# Patient Record
Sex: MALE | Race: BLACK OR AFRICAN AMERICAN | NOT HISPANIC OR LATINO | Employment: FULL TIME | RURAL
[De-identification: names, ages, dates, MRNs, and addresses within clinical notes are randomized per-mention and may not be internally consistent; named-entity substitution may affect disease eponyms.]

---

## 2021-07-21 ENCOUNTER — OFFICE VISIT (OUTPATIENT)
Dept: FAMILY MEDICINE | Facility: CLINIC | Age: 36
End: 2021-07-21
Payer: COMMERCIAL

## 2021-07-21 VITALS
SYSTOLIC BLOOD PRESSURE: 170 MMHG | HEIGHT: 70 IN | HEART RATE: 84 BPM | BODY MASS INDEX: 28.77 KG/M2 | DIASTOLIC BLOOD PRESSURE: 108 MMHG | RESPIRATION RATE: 16 BRPM | WEIGHT: 201 LBS

## 2021-07-21 DIAGNOSIS — I10 HYPERTENSION, UNSPECIFIED TYPE: Primary | ICD-10-CM

## 2021-07-21 PROCEDURE — 99213 PR OFFICE/OUTPT VISIT, EST, LEVL III, 20-29 MIN: ICD-10-PCS | Mod: ,,, | Performed by: NURSE PRACTITIONER

## 2021-07-21 PROCEDURE — 3080F PR MOST RECENT DIASTOLIC BLOOD PRESSURE >= 90 MM HG: ICD-10-PCS | Mod: CPTII,,, | Performed by: NURSE PRACTITIONER

## 2021-07-21 PROCEDURE — 3077F SYST BP >= 140 MM HG: CPT | Mod: CPTII,,, | Performed by: NURSE PRACTITIONER

## 2021-07-21 PROCEDURE — 99213 OFFICE O/P EST LOW 20 MIN: CPT | Mod: ,,, | Performed by: NURSE PRACTITIONER

## 2021-07-21 PROCEDURE — 3077F PR MOST RECENT SYSTOLIC BLOOD PRESSURE >= 140 MM HG: ICD-10-PCS | Mod: CPTII,,, | Performed by: NURSE PRACTITIONER

## 2021-07-21 PROCEDURE — 3080F DIAST BP >= 90 MM HG: CPT | Mod: CPTII,,, | Performed by: NURSE PRACTITIONER

## 2021-07-21 PROCEDURE — 3008F BODY MASS INDEX DOCD: CPT | Mod: CPTII,,, | Performed by: NURSE PRACTITIONER

## 2021-07-21 PROCEDURE — 3008F PR BODY MASS INDEX (BMI) DOCUMENTED: ICD-10-PCS | Mod: CPTII,,, | Performed by: NURSE PRACTITIONER

## 2021-07-21 RX ORDER — LOSARTAN POTASSIUM AND HYDROCHLOROTHIAZIDE 12.5; 5 MG/1; MG/1
1 TABLET ORAL DAILY
Qty: 90 TABLET | Refills: 3 | Status: SHIPPED | OUTPATIENT
Start: 2021-07-21 | End: 2021-08-24

## 2021-08-20 ENCOUNTER — CLINICAL SUPPORT (OUTPATIENT)
Dept: FAMILY MEDICINE | Facility: CLINIC | Age: 36
End: 2021-08-20
Payer: COMMERCIAL

## 2021-08-20 VITALS — DIASTOLIC BLOOD PRESSURE: 103 MMHG | SYSTOLIC BLOOD PRESSURE: 155 MMHG

## 2021-08-20 RX ORDER — LOSARTAN POTASSIUM AND HYDROCHLOROTHIAZIDE 25; 100 MG/1; MG/1
1 TABLET ORAL DAILY
Qty: 30 TABLET | Refills: 0 | Status: SHIPPED | OUTPATIENT
Start: 2021-08-20 | End: 2021-08-24

## 2021-08-20 RX ORDER — LOSARTAN POTASSIUM AND HYDROCHLOROTHIAZIDE 25; 100 MG/1; MG/1
1 TABLET ORAL DAILY
COMMUNITY
End: 2021-08-20 | Stop reason: SDUPTHER

## 2021-08-24 ENCOUNTER — OFFICE VISIT (OUTPATIENT)
Dept: FAMILY MEDICINE | Facility: CLINIC | Age: 36
End: 2021-08-24
Payer: COMMERCIAL

## 2021-08-24 VITALS
RESPIRATION RATE: 16 BRPM | WEIGHT: 199 LBS | HEART RATE: 80 BPM | BODY MASS INDEX: 28.49 KG/M2 | SYSTOLIC BLOOD PRESSURE: 143 MMHG | HEIGHT: 70 IN | DIASTOLIC BLOOD PRESSURE: 91 MMHG

## 2021-08-24 DIAGNOSIS — I10 ESSENTIAL HYPERTENSION, MALIGNANT: Primary | ICD-10-CM

## 2021-08-24 LAB
ALBUMIN SERPL BCP-MCNC: 1.4 G/DL (ref 3.5–5)
ALBUMIN/GLOB SERPL: 0.4 {RATIO}
ALP SERPL-CCNC: 58 U/L (ref 45–115)
ALT SERPL W P-5'-P-CCNC: 33 U/L (ref 16–61)
ANION GAP SERPL CALCULATED.3IONS-SCNC: 9 MMOL/L (ref 7–16)
AST SERPL W P-5'-P-CCNC: 63 U/L (ref 15–37)
BASOPHILS # BLD AUTO: 0.07 K/UL (ref 0–0.2)
BASOPHILS NFR BLD AUTO: 0.9 % (ref 0–1)
BILIRUB SERPL-MCNC: 0.4 MG/DL (ref 0–1.2)
BUN SERPL-MCNC: 20 MG/DL (ref 7–18)
BUN/CREAT SERPL: 17 (ref 6–20)
CALCIUM SERPL-MCNC: 8.5 MG/DL (ref 8.5–10.1)
CHLORIDE SERPL-SCNC: 105 MMOL/L (ref 98–107)
CHOLEST SERPL-MCNC: 365 MG/DL (ref 0–200)
CHOLEST/HDLC SERPL: 4.2 {RATIO}
CO2 SERPL-SCNC: 29 MMOL/L (ref 21–32)
CREAT SERPL-MCNC: 1.17 MG/DL (ref 0.7–1.3)
DIFFERENTIAL METHOD BLD: ABNORMAL
EOSINOPHIL # BLD AUTO: 0.32 K/UL (ref 0–0.5)
EOSINOPHIL NFR BLD AUTO: 4.3 % (ref 1–4)
ERYTHROCYTE [DISTWIDTH] IN BLOOD BY AUTOMATED COUNT: 13.1 % (ref 11.5–14.5)
GLOBULIN SER-MCNC: 4 G/DL (ref 2–4)
GLUCOSE SERPL-MCNC: 78 MG/DL (ref 74–106)
HCT VFR BLD AUTO: 42.2 % (ref 40–54)
HDLC SERPL-MCNC: 87 MG/DL (ref 40–60)
HGB BLD-MCNC: 14.3 G/DL (ref 13.5–18)
IMM GRANULOCYTES # BLD AUTO: 0.01 K/UL (ref 0–0.04)
IMM GRANULOCYTES NFR BLD: 0.1 % (ref 0–0.4)
LDLC SERPL CALC-MCNC: 261 MG/DL
LDLC/HDLC SERPL: 3 {RATIO}
LYMPHOCYTES # BLD AUTO: 2.77 K/UL (ref 1–4.8)
LYMPHOCYTES NFR BLD AUTO: 37.1 % (ref 27–41)
MCH RBC QN AUTO: 31 PG (ref 27–31)
MCHC RBC AUTO-ENTMCNC: 33.9 G/DL (ref 32–36)
MCV RBC AUTO: 91.3 FL (ref 80–96)
MONOCYTES # BLD AUTO: 0.52 K/UL (ref 0–0.8)
MONOCYTES NFR BLD AUTO: 7 % (ref 2–6)
MPC BLD CALC-MCNC: 9.9 FL (ref 9.4–12.4)
NEUTROPHILS # BLD AUTO: 3.78 K/UL (ref 1.8–7.7)
NEUTROPHILS NFR BLD AUTO: 50.6 % (ref 53–65)
NONHDLC SERPL-MCNC: 278 MG/DL
NRBC # BLD AUTO: 0 X10E3/UL
NRBC, AUTO (.00): 0 %
PLATELET # BLD AUTO: 427 K/UL (ref 150–400)
POTASSIUM SERPL-SCNC: 4.3 MMOL/L (ref 3.5–5.1)
PROT SERPL-MCNC: 5.4 G/DL (ref 6.4–8.2)
RBC # BLD AUTO: 4.62 M/UL (ref 4.6–6.2)
SODIUM SERPL-SCNC: 139 MMOL/L (ref 136–145)
TRIGL SERPL-MCNC: 83 MG/DL (ref 35–150)
VLDLC SERPL-MCNC: 17 MG/DL
WBC # BLD AUTO: 7.47 K/UL (ref 4.5–11)

## 2021-08-24 PROCEDURE — 3077F PR MOST RECENT SYSTOLIC BLOOD PRESSURE >= 140 MM HG: ICD-10-PCS | Mod: CPTII,,, | Performed by: NURSE PRACTITIONER

## 2021-08-24 PROCEDURE — 85025 COMPLETE CBC W/AUTO DIFF WBC: CPT | Mod: ,,, | Performed by: CLINICAL MEDICAL LABORATORY

## 2021-08-24 PROCEDURE — 1160F RVW MEDS BY RX/DR IN RCRD: CPT | Mod: CPTII,,, | Performed by: NURSE PRACTITIONER

## 2021-08-24 PROCEDURE — 3008F BODY MASS INDEX DOCD: CPT | Mod: CPTII,,, | Performed by: NURSE PRACTITIONER

## 2021-08-24 PROCEDURE — 80061 LIPID PANEL: ICD-10-PCS | Mod: ,,, | Performed by: CLINICAL MEDICAL LABORATORY

## 2021-08-24 PROCEDURE — 1159F MED LIST DOCD IN RCRD: CPT | Mod: CPTII,,, | Performed by: NURSE PRACTITIONER

## 2021-08-24 PROCEDURE — 3008F PR BODY MASS INDEX (BMI) DOCUMENTED: ICD-10-PCS | Mod: CPTII,,, | Performed by: NURSE PRACTITIONER

## 2021-08-24 PROCEDURE — 85025 CBC WITH DIFFERENTIAL: ICD-10-PCS | Mod: ,,, | Performed by: CLINICAL MEDICAL LABORATORY

## 2021-08-24 PROCEDURE — 3080F DIAST BP >= 90 MM HG: CPT | Mod: CPTII,,, | Performed by: NURSE PRACTITIONER

## 2021-08-24 PROCEDURE — 80053 COMPREHEN METABOLIC PANEL: CPT | Mod: ,,, | Performed by: CLINICAL MEDICAL LABORATORY

## 2021-08-24 PROCEDURE — 99213 OFFICE O/P EST LOW 20 MIN: CPT | Mod: ,,, | Performed by: NURSE PRACTITIONER

## 2021-08-24 PROCEDURE — 80061 LIPID PANEL: CPT | Mod: ,,, | Performed by: CLINICAL MEDICAL LABORATORY

## 2021-08-24 PROCEDURE — 80053 COMPREHENSIVE METABOLIC PANEL: ICD-10-PCS | Mod: ,,, | Performed by: CLINICAL MEDICAL LABORATORY

## 2021-08-24 PROCEDURE — 1160F PR REVIEW ALL MEDS BY PRESCRIBER/CLIN PHARMACIST DOCUMENTED: ICD-10-PCS | Mod: CPTII,,, | Performed by: NURSE PRACTITIONER

## 2021-08-24 PROCEDURE — 3080F PR MOST RECENT DIASTOLIC BLOOD PRESSURE >= 90 MM HG: ICD-10-PCS | Mod: CPTII,,, | Performed by: NURSE PRACTITIONER

## 2021-08-24 PROCEDURE — 99213 PR OFFICE/OUTPT VISIT, EST, LEVL III, 20-29 MIN: ICD-10-PCS | Mod: ,,, | Performed by: NURSE PRACTITIONER

## 2021-08-24 PROCEDURE — 1159F PR MEDICATION LIST DOCUMENTED IN MEDICAL RECORD: ICD-10-PCS | Mod: CPTII,,, | Performed by: NURSE PRACTITIONER

## 2021-08-24 PROCEDURE — 3077F SYST BP >= 140 MM HG: CPT | Mod: CPTII,,, | Performed by: NURSE PRACTITIONER

## 2021-08-24 RX ORDER — LOSARTAN POTASSIUM AND HYDROCHLOROTHIAZIDE 25; 100 MG/1; MG/1
1 TABLET ORAL DAILY
Qty: 90 TABLET | Refills: 0 | Status: SHIPPED | OUTPATIENT
Start: 2021-08-24 | End: 2021-11-19 | Stop reason: SDUPTHER

## 2021-08-24 RX ORDER — AMLODIPINE BESYLATE 10 MG/1
10 TABLET ORAL DAILY
Qty: 30 TABLET | Refills: 11 | Status: SHIPPED | OUTPATIENT
Start: 2021-08-24 | End: 2021-11-29 | Stop reason: SDUPTHER

## 2021-11-29 ENCOUNTER — OFFICE VISIT (OUTPATIENT)
Dept: FAMILY MEDICINE | Facility: CLINIC | Age: 36
End: 2021-11-29
Payer: COMMERCIAL

## 2021-11-29 VITALS
DIASTOLIC BLOOD PRESSURE: 83 MMHG | SYSTOLIC BLOOD PRESSURE: 130 MMHG | HEIGHT: 70 IN | WEIGHT: 191 LBS | BODY MASS INDEX: 27.35 KG/M2 | RESPIRATION RATE: 16 BRPM | HEART RATE: 89 BPM

## 2021-11-29 DIAGNOSIS — N18.9 CHRONIC KIDNEY DISEASE, UNSPECIFIED CKD STAGE: ICD-10-CM

## 2021-11-29 DIAGNOSIS — I10 ESSENTIAL HYPERTENSION, MALIGNANT: Primary | ICD-10-CM

## 2021-11-29 DIAGNOSIS — I10 ESSENTIAL HYPERTENSION, MALIGNANT: ICD-10-CM

## 2021-11-29 DIAGNOSIS — Z13.1 SCREENING FOR DIABETES MELLITUS: ICD-10-CM

## 2021-11-29 LAB
ALBUMIN SERPL BCP-MCNC: 1.8 G/DL (ref 3.5–5)
ALBUMIN/GLOB SERPL: 0.4 {RATIO}
ALP SERPL-CCNC: 68 U/L (ref 45–115)
ALT SERPL W P-5'-P-CCNC: 36 U/L (ref 16–61)
ANION GAP SERPL CALCULATED.3IONS-SCNC: 8 MMOL/L (ref 7–16)
AST SERPL W P-5'-P-CCNC: 50 U/L (ref 15–37)
BILIRUB SERPL-MCNC: 0.5 MG/DL (ref 0–1.2)
BUN SERPL-MCNC: 18 MG/DL (ref 7–18)
BUN/CREAT SERPL: 16 (ref 6–20)
CALCIUM SERPL-MCNC: 8.5 MG/DL (ref 8.5–10.1)
CHLORIDE SERPL-SCNC: 105 MMOL/L (ref 98–107)
CHOLEST SERPL-MCNC: 352 MG/DL (ref 0–200)
CHOLEST/HDLC SERPL: 3.3 {RATIO}
CO2 SERPL-SCNC: 31 MMOL/L (ref 21–32)
CREAT SERPL-MCNC: 1.14 MG/DL (ref 0.7–1.3)
EST. AVERAGE GLUCOSE BLD GHB EST-MCNC: 87 MG/DL
GLOBULIN SER-MCNC: 4.1 G/DL (ref 2–4)
GLUCOSE SERPL-MCNC: 79 MG/DL (ref 74–106)
HBA1C MFR BLD HPLC: 5.2 % (ref 4.5–6.6)
HDLC SERPL-MCNC: 107 MG/DL (ref 40–60)
LDLC SERPL CALC-MCNC: 219 MG/DL
LDLC/HDLC SERPL: 2 {RATIO}
NONHDLC SERPL-MCNC: 245 MG/DL
POTASSIUM SERPL-SCNC: 3.9 MMOL/L (ref 3.5–5.1)
PROT SERPL-MCNC: 5.9 G/DL (ref 6.4–8.2)
SODIUM SERPL-SCNC: 140 MMOL/L (ref 136–145)
TRIGL SERPL-MCNC: 131 MG/DL (ref 35–150)
VLDLC SERPL-MCNC: 26 MG/DL

## 2021-11-29 PROCEDURE — 80061 LIPID PANEL: CPT | Mod: ,,, | Performed by: CLINICAL MEDICAL LABORATORY

## 2021-11-29 PROCEDURE — 80053 COMPREHEN METABOLIC PANEL: CPT | Mod: ,,, | Performed by: CLINICAL MEDICAL LABORATORY

## 2021-11-29 PROCEDURE — 99212 OFFICE O/P EST SF 10 MIN: CPT | Mod: ,,, | Performed by: NURSE PRACTITIONER

## 2021-11-29 PROCEDURE — 83036 HEMOGLOBIN GLYCOSYLATED A1C: CPT | Mod: ,,, | Performed by: CLINICAL MEDICAL LABORATORY

## 2021-11-29 PROCEDURE — 80061 LIPID PANEL: ICD-10-PCS | Mod: ,,, | Performed by: CLINICAL MEDICAL LABORATORY

## 2021-11-29 PROCEDURE — 80053 COMPREHENSIVE METABOLIC PANEL: ICD-10-PCS | Mod: ,,, | Performed by: CLINICAL MEDICAL LABORATORY

## 2021-11-29 PROCEDURE — 83036 HEMOGLOBIN A1C: ICD-10-PCS | Mod: ,,, | Performed by: CLINICAL MEDICAL LABORATORY

## 2021-11-29 PROCEDURE — 99212 PR OFFICE/OUTPT VISIT, EST, LEVL II, 10-19 MIN: ICD-10-PCS | Mod: ,,, | Performed by: NURSE PRACTITIONER

## 2021-11-29 RX ORDER — LOSARTAN POTASSIUM AND HYDROCHLOROTHIAZIDE 25; 100 MG/1; MG/1
1 TABLET ORAL DAILY
Qty: 90 TABLET | Refills: 0 | Status: SHIPPED | OUTPATIENT
Start: 2021-11-29 | End: 2022-08-31 | Stop reason: SDUPTHER

## 2021-11-29 RX ORDER — AMLODIPINE BESYLATE 10 MG/1
10 TABLET ORAL DAILY
Qty: 90 TABLET | Refills: 1 | Status: SHIPPED | OUTPATIENT
Start: 2021-11-29 | End: 2022-08-31 | Stop reason: SDUPTHER

## 2022-08-31 DIAGNOSIS — I10 ESSENTIAL HYPERTENSION, MALIGNANT: ICD-10-CM

## 2022-08-31 RX ORDER — AMLODIPINE BESYLATE 10 MG/1
10 TABLET ORAL DAILY
Qty: 30 TABLET | Refills: 0 | Status: SHIPPED | OUTPATIENT
Start: 2022-08-31 | End: 2022-09-06 | Stop reason: SDUPTHER

## 2022-08-31 RX ORDER — LOSARTAN POTASSIUM AND HYDROCHLOROTHIAZIDE 25; 100 MG/1; MG/1
1 TABLET ORAL DAILY
Qty: 30 TABLET | Refills: 0 | Status: SHIPPED | OUTPATIENT
Start: 2022-08-31 | End: 2022-09-06 | Stop reason: SDUPTHER

## 2022-09-06 ENCOUNTER — OFFICE VISIT (OUTPATIENT)
Dept: FAMILY MEDICINE | Facility: CLINIC | Age: 37
End: 2022-09-06
Payer: COMMERCIAL

## 2022-09-06 VITALS
HEIGHT: 70 IN | WEIGHT: 184 LBS | OXYGEN SATURATION: 100 % | RESPIRATION RATE: 18 BRPM | TEMPERATURE: 98 F | SYSTOLIC BLOOD PRESSURE: 133 MMHG | DIASTOLIC BLOOD PRESSURE: 87 MMHG | HEART RATE: 80 BPM | BODY MASS INDEX: 26.34 KG/M2

## 2022-09-06 DIAGNOSIS — Z11.59 SCREENING FOR VIRAL DISEASE: ICD-10-CM

## 2022-09-06 DIAGNOSIS — I10 ESSENTIAL HYPERTENSION, MALIGNANT: Primary | ICD-10-CM

## 2022-09-06 DIAGNOSIS — R73.01 IFG (IMPAIRED FASTING GLUCOSE): ICD-10-CM

## 2022-09-06 DIAGNOSIS — I10 ESSENTIAL HYPERTENSION, MALIGNANT: ICD-10-CM

## 2022-09-06 LAB
ALBUMIN SERPL BCP-MCNC: 1.9 G/DL (ref 3.5–5)
ALBUMIN/GLOB SERPL: 0.7 {RATIO}
ALP SERPL-CCNC: 61 U/L (ref 45–115)
ALT SERPL W P-5'-P-CCNC: 35 U/L (ref 16–61)
ANION GAP SERPL CALCULATED.3IONS-SCNC: 9 MMOL/L (ref 7–16)
AST SERPL W P-5'-P-CCNC: 55 U/L (ref 15–37)
BILIRUB SERPL-MCNC: 0.3 MG/DL (ref ?–1.2)
BUN SERPL-MCNC: 21 MG/DL (ref 7–18)
BUN/CREAT SERPL: 20 (ref 6–20)
CALCIUM SERPL-MCNC: 8.5 MG/DL (ref 8.5–10.1)
CHLORIDE SERPL-SCNC: 106 MMOL/L (ref 98–107)
CHOLEST SERPL-MCNC: 275 MG/DL (ref 0–200)
CHOLEST/HDLC SERPL: 3.2 {RATIO}
CO2 SERPL-SCNC: 28 MMOL/L (ref 21–32)
CREAT SERPL-MCNC: 1.05 MG/DL (ref 0.7–1.3)
EGFR (NO RACE VARIABLE) (RUSH/TITUS): 94 ML/MIN/1.73M²
GLOBULIN SER-MCNC: 2.9 G/DL (ref 2–4)
GLUCOSE SERPL-MCNC: 94 MG/DL (ref 74–106)
HDLC SERPL-MCNC: 85 MG/DL (ref 40–60)
HIV 1+O+2 AB SERPL QL: NORMAL
LDLC SERPL CALC-MCNC: 170 MG/DL
LDLC/HDLC SERPL: 2 {RATIO}
NONHDLC SERPL-MCNC: 190 MG/DL
POTASSIUM SERPL-SCNC: 4.8 MMOL/L (ref 3.5–5.1)
PROT SERPL-MCNC: 4.8 G/DL (ref 6.4–8.2)
SODIUM SERPL-SCNC: 138 MMOL/L (ref 136–145)
TRIGL SERPL-MCNC: 98 MG/DL (ref 35–150)
VLDLC SERPL-MCNC: 20 MG/DL

## 2022-09-06 PROCEDURE — 1160F RVW MEDS BY RX/DR IN RCRD: CPT | Mod: CPTII,,, | Performed by: NURSE PRACTITIONER

## 2022-09-06 PROCEDURE — 80053 COMPREHENSIVE METABOLIC PANEL: ICD-10-PCS | Mod: ,,, | Performed by: CLINICAL MEDICAL LABORATORY

## 2022-09-06 PROCEDURE — 82570 MICROALBUMIN / CREATININE RATIO URINE: ICD-10-PCS | Mod: ,,, | Performed by: CLINICAL MEDICAL LABORATORY

## 2022-09-06 PROCEDURE — 3008F BODY MASS INDEX DOCD: CPT | Mod: CPTII,,, | Performed by: NURSE PRACTITIONER

## 2022-09-06 PROCEDURE — 87389 HIV-1 AG W/HIV-1&-2 AB AG IA: CPT | Mod: ,,, | Performed by: CLINICAL MEDICAL LABORATORY

## 2022-09-06 PROCEDURE — 80061 LIPID PANEL: CPT | Mod: ,,, | Performed by: CLINICAL MEDICAL LABORATORY

## 2022-09-06 PROCEDURE — 83036 HEMOGLOBIN GLYCOSYLATED A1C: CPT | Mod: ,,, | Performed by: CLINICAL MEDICAL LABORATORY

## 2022-09-06 PROCEDURE — 3075F SYST BP GE 130 - 139MM HG: CPT | Mod: CPTII,,, | Performed by: NURSE PRACTITIONER

## 2022-09-06 PROCEDURE — 87389 HIV 1 / 2 ANTIBODY: ICD-10-PCS | Mod: ,,, | Performed by: CLINICAL MEDICAL LABORATORY

## 2022-09-06 PROCEDURE — 80053 COMPREHEN METABOLIC PANEL: CPT | Mod: ,,, | Performed by: CLINICAL MEDICAL LABORATORY

## 2022-09-06 PROCEDURE — 80061 LIPID PANEL: ICD-10-PCS | Mod: ,,, | Performed by: CLINICAL MEDICAL LABORATORY

## 2022-09-06 PROCEDURE — 86803 HEPATITIS C ANTIBODY: ICD-10-PCS | Mod: ,,, | Performed by: CLINICAL MEDICAL LABORATORY

## 2022-09-06 PROCEDURE — 3066F PR DOCUMENTATION OF TREATMENT FOR NEPHROPATHY: ICD-10-PCS | Mod: CPTII,,, | Performed by: NURSE PRACTITIONER

## 2022-09-06 PROCEDURE — 3062F PR POS MACROALBUMINURIA RESULT DOCUMENTED/REVIEW: ICD-10-PCS | Mod: CPTII,,, | Performed by: NURSE PRACTITIONER

## 2022-09-06 PROCEDURE — 3008F PR BODY MASS INDEX (BMI) DOCUMENTED: ICD-10-PCS | Mod: CPTII,,, | Performed by: NURSE PRACTITIONER

## 2022-09-06 PROCEDURE — 3044F HG A1C LEVEL LT 7.0%: CPT | Mod: CPTII,,, | Performed by: NURSE PRACTITIONER

## 2022-09-06 PROCEDURE — 3044F PR MOST RECENT HEMOGLOBIN A1C LEVEL <7.0%: ICD-10-PCS | Mod: CPTII,,, | Performed by: NURSE PRACTITIONER

## 2022-09-06 PROCEDURE — 3062F POS MACROALBUMINURIA REV: CPT | Mod: CPTII,,, | Performed by: NURSE PRACTITIONER

## 2022-09-06 PROCEDURE — 1160F PR REVIEW ALL MEDS BY PRESCRIBER/CLIN PHARMACIST DOCUMENTED: ICD-10-PCS | Mod: CPTII,,, | Performed by: NURSE PRACTITIONER

## 2022-09-06 PROCEDURE — 3066F NEPHROPATHY DOC TX: CPT | Mod: CPTII,,, | Performed by: NURSE PRACTITIONER

## 2022-09-06 PROCEDURE — 86803 HEPATITIS C AB TEST: CPT | Mod: ,,, | Performed by: CLINICAL MEDICAL LABORATORY

## 2022-09-06 PROCEDURE — 83036 HEMOGLOBIN A1C: ICD-10-PCS | Mod: ,,, | Performed by: CLINICAL MEDICAL LABORATORY

## 2022-09-06 PROCEDURE — 3079F DIAST BP 80-89 MM HG: CPT | Mod: CPTII,,, | Performed by: NURSE PRACTITIONER

## 2022-09-06 PROCEDURE — 82570 ASSAY OF URINE CREATININE: CPT | Mod: ,,, | Performed by: CLINICAL MEDICAL LABORATORY

## 2022-09-06 PROCEDURE — 99214 OFFICE O/P EST MOD 30 MIN: CPT | Mod: ,,, | Performed by: NURSE PRACTITIONER

## 2022-09-06 PROCEDURE — 3075F PR MOST RECENT SYSTOLIC BLOOD PRESS GE 130-139MM HG: ICD-10-PCS | Mod: CPTII,,, | Performed by: NURSE PRACTITIONER

## 2022-09-06 PROCEDURE — 82043 UR ALBUMIN QUANTITATIVE: CPT | Mod: ,,, | Performed by: CLINICAL MEDICAL LABORATORY

## 2022-09-06 PROCEDURE — 1159F PR MEDICATION LIST DOCUMENTED IN MEDICAL RECORD: ICD-10-PCS | Mod: CPTII,,, | Performed by: NURSE PRACTITIONER

## 2022-09-06 PROCEDURE — 99214 PR OFFICE/OUTPT VISIT, EST, LEVL IV, 30-39 MIN: ICD-10-PCS | Mod: ,,, | Performed by: NURSE PRACTITIONER

## 2022-09-06 PROCEDURE — 82043 MICROALBUMIN / CREATININE RATIO URINE: ICD-10-PCS | Mod: ,,, | Performed by: CLINICAL MEDICAL LABORATORY

## 2022-09-06 PROCEDURE — 1159F MED LIST DOCD IN RCRD: CPT | Mod: CPTII,,, | Performed by: NURSE PRACTITIONER

## 2022-09-06 PROCEDURE — 3079F PR MOST RECENT DIASTOLIC BLOOD PRESSURE 80-89 MM HG: ICD-10-PCS | Mod: CPTII,,, | Performed by: NURSE PRACTITIONER

## 2022-09-06 RX ORDER — LOSARTAN POTASSIUM AND HYDROCHLOROTHIAZIDE 25; 100 MG/1; MG/1
1 TABLET ORAL DAILY
Qty: 90 TABLET | Refills: 0 | Status: SHIPPED | OUTPATIENT
Start: 2022-09-06 | End: 2022-12-16 | Stop reason: SDUPTHER

## 2022-09-06 RX ORDER — AMLODIPINE BESYLATE 10 MG/1
10 TABLET ORAL DAILY
Qty: 90 TABLET | Refills: 0 | Status: SHIPPED | OUTPATIENT
Start: 2022-09-06 | End: 2023-06-13 | Stop reason: SDUPTHER

## 2022-09-06 NOTE — PROGRESS NOTES
JAY Robert   [facility] [address] [place] [address]  [phone]      PATIENT NAME: Ti Hobbs  : 1985  DATE: 22  MRN: [id number]      Billing Provider: JAY Robert  Level of Service:   Patient PCP Information       Provider PCP Type    JAY Robert General            Reason for Visit / Chief Complaint: Hypertension and Hyperlipidemia       Update PCP  Update Chief Complaint         History of Present Illness / Problem Focused Workflow     Ti Hobbs presents to the clinic with Hypertension and Hyperlipidemia     Pt presents for routine follow up with med refills. He reports he has been out of 1 of his bp pills for several days. Will refill meds. Bp today much improved from previous. Will obtain lab today     Advised to monitor BP at home. Advised on optimal BP readings - SBP < 130 & DBP < 80. Advised to call office for any persistent BP elevation and may have to prescribe or adjust BP med(s).  Recommended DASH diet, stay well hydrated with water daily, eliminate or decrease caffeinated and high calorie drinks, increase physical activity, and lose weight if BMI > 25.0.        Review of Systems     Review of Systems   Constitutional:  Negative for fatigue and fever.   HENT:  Negative for nasal congestion and sore throat.    Eyes:  Negative for visual disturbance.   Respiratory:  Negative for chest tightness and shortness of breath.    Cardiovascular:  Negative for chest pain and leg swelling.   Gastrointestinal:  Negative for abdominal pain, change in bowel habit and change in bowel habit.   Endocrine: Negative for polydipsia, polyphagia and polyuria.   Genitourinary:  Negative for dysuria and hematuria.   Musculoskeletal:  Negative for back pain and leg pain.   Integumentary:  Negative for rash.   Neurological:  Negative for dizziness, syncope, weakness and  light-headedness.      Medical / Social / Family History     Past Medical History:   Diagnosis Date    Hypertension        History reviewed. No pertinent surgical history.    Social History  Mr. Hobbs  reports that he has been smoking cigarettes. He has a 9.00 pack-year smoking history. He has never used smokeless tobacco. He reports current alcohol use. He reports current drug use. Drug: Marijuana.    Family History  Mr. Hobbs's family history includes Cancer in his maternal grandmother; Hypertension in his father and mother.    Medications and Allergies     Medications  Outpatient Medications Marked as Taking for the 9/6/22 encounter (Office Visit) with JAY Robert   Medication Sig Dispense Refill    [DISCONTINUED] losartan-hydrochlorothiazide 100-25 mg (HYZAAR) 100-25 mg per tablet Take 1 tablet by mouth once daily. 30 tablet 0       Allergies  Review of patient's allergies indicates:   Allergen Reactions    Penicillins        Physical Examination     Vitals:    09/06/22 1108   BP: 133/87   Pulse: 80   Resp: 18   Temp: 97.6 °F (36.4 °C)     Physical Exam  Eyes:      Pupils: Pupils are equal, round, and reactive to light.   Cardiovascular:      Rate and Rhythm: Normal rate and regular rhythm.      Heart sounds: Normal heart sounds. No murmur heard.  Pulmonary:      Breath sounds: Normal breath sounds. No wheezing, rhonchi or rales.   Abdominal:      General: Bowel sounds are normal.   Musculoskeletal:         General: No swelling.      Cervical back: Normal range of motion and neck supple.   Skin:     General: Skin is warm and dry.   Neurological:      Mental Status: He is alert and oriented to person, place, and time.        Sodium   Date Value Ref Range Status   09/06/2022 138 136 - 145 mmol/L Final     Potassium   Date Value Ref Range Status   09/06/2022 4.8 3.5 - 5.1 mmol/L Final     Chloride   Date Value Ref Range Status   09/06/2022 106 98 - 107 mmol/L Final     CO2   Date Value Ref Range  Status   09/06/2022 28 21 - 32 mmol/L Final     Anion Gap   Date Value Ref Range Status   09/06/2022 9 7 - 16 mmol/L Final     Glucose   Date Value Ref Range Status   09/06/2022 94 74 - 106 mg/dL Final     BUN   Date Value Ref Range Status   09/06/2022 21 (H) 7 - 18 mg/dL Final     Calcium   Date Value Ref Range Status   09/06/2022 8.5 8.5 - 10.1 mg/dL Final     Total Protein   Date Value Ref Range Status   09/06/2022 4.8 (L) 6.4 - 8.2 g/dL Final     Albumin   Date Value Ref Range Status   09/06/2022 1.9 (L) 3.5 - 5.0 g/dL Final     A/G Ratio   Date Value Ref Range Status   09/06/2022 0.7  Final     Bilirubin, Total   Date Value Ref Range Status   09/06/2022 0.3 >0.0 - 1.2 mg/dL Final     ALT   Date Value Ref Range Status   09/06/2022 35 16 - 61 U/L Final     AST   Date Value Ref Range Status   09/06/2022 55 (H) 15 - 37 U/L Final     eGFR    Date Value Ref Range Status   11/29/2021 93 >=60 mL/min/1.73m² Final        Cholesterol   Date Value Ref Range Status   09/06/2022 275 (H) 0 - 200 mg/dL Final     Comment:       <200 mg/dL:Desirable  200-240 mg/dL:Borderline High  >240 mg/dL:High     HDL Cholesterol   Date Value Ref Range Status   09/06/2022 85 (H) 40 - 60 mg/dL Final     Comment:       <40 mg/dL:Low HDL  40-60 mg/dL:Normal  >60 mg/dL:Desirable     LDL Calculated   Date Value Ref Range Status   09/06/2022 170 mg/dL Final     Comment:     Unable to calculate due to one of the following values:  Cholesterol <5  HDL Cholesterol <5  Triglycerides <10 or >400     Triglycerides   Date Value Ref Range Status   09/06/2022 98 35 - 150 mg/dL Final     Comment:       Normal:<150 mg/dL  Borderline High:150-199 mg/dL  High:200-499 mg/dL  Very High:>=500        Hemoglobin A1C   Date Value Ref Range Status   09/06/2022 5.2 4.5 - 6.6 % Final     Comment:       Normal:               <5.7%  Pre-Diabetic:       5.7% to 6.4%  Diabetic:             >6.4%  Diabetic Goal:     <7%        WBC   Date Value Ref Range  Status   08/24/2021 7.47 4.50 - 11.00 K/uL Final     Hemoglobin   Date Value Ref Range Status   08/24/2021 14.3 13.5 - 18.0 g/dL Final     Hematocrit   Date Value Ref Range Status   08/24/2021 42.2 40.0 - 54.0 % Final     Platelet Count   Date Value Ref Range Status   08/24/2021 427 (H) 150 - 400 K/uL Final        Assessment and Plan (including Health Maintenance)      Problem List  Smart Sets  Document Outside HM   :    Plan:   Refill home meds  Review labs       Health Maintenance Due   Topic Date Due    Hepatitis C Screening  Never done    Pneumococcal Vaccines (Age 0-64) (1 - PCV) Never done    HIV Screening  Never done    TETANUS VACCINE  Never done    COVID-19 Vaccine (2 - Mixed Product series) 10/15/2021    Influenza Vaccine (1) Never done       Problem List Items Addressed This Visit    None  Visit Diagnoses       Essential hypertension, malignant    -  Primary    cont losartan/hctz 100/25mg daily and Norvasc 10mg   goal bp less than 130/80    Relevant Medications    amLODIPine (NORVASC) 10 MG tablet    losartan-hydrochlorothiazide 100-25 mg (HYZAAR) 100-25 mg per tablet    Other Relevant Orders    Comprehensive Metabolic Panel    Lipid Panel    Microalbumin/Creatinine Ratio, Urine    Essential hypertension, malignant        Relevant Medications    amLODIPine (NORVASC) 10 MG tablet    losartan-hydrochlorothiazide 100-25 mg (HYZAAR) 100-25 mg per tablet    Other Relevant Orders    Comprehensive Metabolic Panel    Lipid Panel    Microalbumin/Creatinine Ratio, Urine    Screening for viral disease        Relevant Orders    Hepatitis C Antibody    HIV 1/2 Ag/Ab (4th Gen)    IFG (impaired fasting glucose)        Relevant Orders    Hemoglobin A1C            Health Maintenance Topics with due status: Not Due       Topic Last Completion Date    Lipid Panel 11/29/2021       Future Appointments   Date Time Provider Department Center   12/19/2022 10:00 AM [facility]             Signature:  [facility] [address] [place] [address]  [phone]    Date of encounter: 9/6/22

## 2022-09-07 LAB
CREAT UR-MCNC: 120 MG/DL (ref 39–259)
EST. AVERAGE GLUCOSE BLD GHB EST-MCNC: 87 MG/DL
HBA1C MFR BLD HPLC: 5.2 % (ref 4.5–6.6)
HCV AB SER QL: NORMAL
MICROALBUMIN UR-MCNC: 282 MG/DL (ref 0–2.8)
MICROALBUMIN/CREAT RATIO PNL UR: 2350 MG/G (ref 0–30)

## 2022-10-06 ENCOUNTER — OFFICE VISIT (OUTPATIENT)
Dept: FAMILY MEDICINE | Facility: CLINIC | Age: 37
End: 2022-10-06
Payer: COMMERCIAL

## 2022-10-06 VITALS
RESPIRATION RATE: 18 BRPM | WEIGHT: 171.38 LBS | BODY MASS INDEX: 24.54 KG/M2 | TEMPERATURE: 98 F | DIASTOLIC BLOOD PRESSURE: 74 MMHG | HEIGHT: 70 IN | OXYGEN SATURATION: 97 % | HEART RATE: 93 BPM | SYSTOLIC BLOOD PRESSURE: 130 MMHG

## 2022-10-06 DIAGNOSIS — R19.7 NAUSEA, VOMITING, AND DIARRHEA: ICD-10-CM

## 2022-10-06 DIAGNOSIS — J10.1 INFLUENZA A VIRUS PRESENT: Primary | ICD-10-CM

## 2022-10-06 DIAGNOSIS — R11.2 NAUSEA, VOMITING, AND DIARRHEA: ICD-10-CM

## 2022-10-06 DIAGNOSIS — R05.9 COUGH IN ADULT PATIENT: ICD-10-CM

## 2022-10-06 LAB
CTP QC/QA: YES
FLUAV AG NPH QL: POSITIVE
FLUBV AG NPH QL: NEGATIVE
SARS-COV-2 AG RESP QL IA.RAPID: NEGATIVE

## 2022-10-06 PROCEDURE — 3008F BODY MASS INDEX DOCD: CPT | Mod: CPTII,,, | Performed by: NURSE PRACTITIONER

## 2022-10-06 PROCEDURE — 1160F PR REVIEW ALL MEDS BY PRESCRIBER/CLIN PHARMACIST DOCUMENTED: ICD-10-PCS | Mod: CPTII,,, | Performed by: NURSE PRACTITIONER

## 2022-10-06 PROCEDURE — 3008F PR BODY MASS INDEX (BMI) DOCUMENTED: ICD-10-PCS | Mod: CPTII,,, | Performed by: NURSE PRACTITIONER

## 2022-10-06 PROCEDURE — 3062F POS MACROALBUMINURIA REV: CPT | Mod: CPTII,,, | Performed by: NURSE PRACTITIONER

## 2022-10-06 PROCEDURE — 3062F PR POS MACROALBUMINURIA RESULT DOCUMENTED/REVIEW: ICD-10-PCS | Mod: CPTII,,, | Performed by: NURSE PRACTITIONER

## 2022-10-06 PROCEDURE — 3044F PR MOST RECENT HEMOGLOBIN A1C LEVEL <7.0%: ICD-10-PCS | Mod: CPTII,,, | Performed by: NURSE PRACTITIONER

## 2022-10-06 PROCEDURE — 3075F PR MOST RECENT SYSTOLIC BLOOD PRESS GE 130-139MM HG: ICD-10-PCS | Mod: CPTII,,, | Performed by: NURSE PRACTITIONER

## 2022-10-06 PROCEDURE — 99213 OFFICE O/P EST LOW 20 MIN: CPT | Mod: ,,, | Performed by: NURSE PRACTITIONER

## 2022-10-06 PROCEDURE — 3078F DIAST BP <80 MM HG: CPT | Mod: CPTII,,, | Performed by: NURSE PRACTITIONER

## 2022-10-06 PROCEDURE — 99213 PR OFFICE/OUTPT VISIT, EST, LEVL III, 20-29 MIN: ICD-10-PCS | Mod: ,,, | Performed by: NURSE PRACTITIONER

## 2022-10-06 PROCEDURE — 87428 POCT SARS-COV2 (COVID) WITH FLU ANTIGEN: ICD-10-PCS | Mod: QW,,, | Performed by: NURSE PRACTITIONER

## 2022-10-06 PROCEDURE — 3044F HG A1C LEVEL LT 7.0%: CPT | Mod: CPTII,,, | Performed by: NURSE PRACTITIONER

## 2022-10-06 PROCEDURE — 3066F NEPHROPATHY DOC TX: CPT | Mod: CPTII,,, | Performed by: NURSE PRACTITIONER

## 2022-10-06 PROCEDURE — 1160F RVW MEDS BY RX/DR IN RCRD: CPT | Mod: CPTII,,, | Performed by: NURSE PRACTITIONER

## 2022-10-06 PROCEDURE — 87428 SARSCOV & INF VIR A&B AG IA: CPT | Mod: QW,,, | Performed by: NURSE PRACTITIONER

## 2022-10-06 PROCEDURE — 3075F SYST BP GE 130 - 139MM HG: CPT | Mod: CPTII,,, | Performed by: NURSE PRACTITIONER

## 2022-10-06 PROCEDURE — 3078F PR MOST RECENT DIASTOLIC BLOOD PRESSURE < 80 MM HG: ICD-10-PCS | Mod: CPTII,,, | Performed by: NURSE PRACTITIONER

## 2022-10-06 PROCEDURE — 1159F MED LIST DOCD IN RCRD: CPT | Mod: CPTII,,, | Performed by: NURSE PRACTITIONER

## 2022-10-06 PROCEDURE — 3066F PR DOCUMENTATION OF TREATMENT FOR NEPHROPATHY: ICD-10-PCS | Mod: CPTII,,, | Performed by: NURSE PRACTITIONER

## 2022-10-06 PROCEDURE — 1159F PR MEDICATION LIST DOCUMENTED IN MEDICAL RECORD: ICD-10-PCS | Mod: CPTII,,, | Performed by: NURSE PRACTITIONER

## 2022-10-06 RX ORDER — AZITHROMYCIN 250 MG/1
TABLET, FILM COATED ORAL
Qty: 6 TABLET | Refills: 0 | Status: SHIPPED | OUTPATIENT
Start: 2022-10-06 | End: 2022-12-19

## 2022-10-06 RX ORDER — CETIRIZINE HYDROCHLORIDE 10 MG/1
10 TABLET ORAL DAILY
Qty: 30 TABLET | Refills: 0 | Status: SHIPPED | OUTPATIENT
Start: 2022-10-06 | End: 2023-06-19 | Stop reason: SDUPTHER

## 2022-10-06 RX ORDER — HYDROXYZINE HYDROCHLORIDE 10 MG/5ML
10 SYRUP ORAL EVERY 8 HOURS PRN
Qty: 120 ML | Refills: 0 | Status: SHIPPED | OUTPATIENT
Start: 2022-10-06 | End: 2022-12-19

## 2022-10-06 NOTE — PROGRESS NOTES
Patito Reed NP   [address] [place] [address]     PATIENT NAME: Ti Hobbs  : 1985  DATE: 10/6/22  MRN: [id number]      Billing Provider: Patito Reed NP  Level of Service: MD OFFICE/OUTPT VISIT, EST, LEVL III, 20-29 MIN  Patient PCP Information       Provider PCP Type    JAY Robert General            Reason for Visit / Chief Complaint: Cough, Nasal Congestion, and Diarrhea       Update PCP  Update Chief Complaint         History of Present Illness / Problem Focused Workflow     Ti Hobbs presents to the clinic with Cough, Nasal Congestion, and Diarrhea     Cough  This is a new problem. The current episode started in the past 7 days. The problem has been waxing and waning. The problem occurs every few hours. The cough is Non-productive. Associated symptoms include ear congestion, nasal congestion and postnasal drip. Pertinent negatives include no fever. Nothing aggravates the symptoms. He has tried nothing for the symptoms.   Diarrhea   This is a new problem. The current episode started today. The problem occurs less than 2 times per day. The problem has been waxing and waning. Associated symptoms include coughing. Pertinent negatives include no abdominal pain or fever. Nothing aggravates the symptoms. There are no known risk factors. He has tried nothing for the symptoms.     Review of Systems     Review of Systems   Constitutional:  Negative for fever.   HENT:  Positive for postnasal drip and sinus pressure/congestion.    Respiratory:  Positive for cough.    Gastrointestinal:  Positive for diarrhea. Negative for abdominal pain.   All other systems reviewed and are negative.     Medical / Social / Family History     Past Medical History:   Diagnosis Date    Hypertension        History reviewed. No pertinent surgical history.    Social History    reports that he has been smoking cigarettes. He has a 9.00 pack-year smoking history.  "He has never used smokeless tobacco. He reports current alcohol use. He reports current drug use. Drug: Marijuana.    Family History  's family history includes Cancer in his maternal grandmother; Hypertension in his father and mother.    Medications and Allergies     Medications  Outpatient Medications Marked as Taking for the 10/6/22 encounter (Office Visit) with Patito Reed NP   Medication Sig Dispense Refill    losartan-hydrochlorothiazide 100-25 mg (HYZAAR) 100-25 mg per tablet Take 1 tablet by mouth once daily. 90 tablet 0       Allergies  Review of patient's allergies indicates:   Allergen Reactions    Penicillins        Physical Examination   /74 (BP Location: Left arm, Patient Position: Sitting, BP Method: Medium (Automatic))   Pulse 93   Temp 97.5 °F (36.4 °C) (Oral)   Resp 18   Ht 5' 10" (1.778 m)   Wt 77.7 kg (171 lb 6.4 oz)   SpO2 97%   BMI 24.59 kg/m²    Physical Exam  Vitals and nursing note reviewed.   Constitutional:       Appearance: Normal appearance.   HENT:      Head: Normocephalic.      Right Ear: Tympanic membrane normal.      Left Ear: Tympanic membrane normal.      Nose: Congestion present.      Mouth/Throat:      Mouth: Mucous membranes are moist.      Pharynx: Oropharynx is clear. Posterior oropharyngeal erythema present.   Eyes:      Conjunctiva/sclera: Conjunctivae normal.      Pupils: Pupils are equal, round, and reactive to light.   Cardiovascular:      Rate and Rhythm: Normal rate and regular rhythm.      Pulses: Normal pulses.      Heart sounds: Normal heart sounds.   Pulmonary:      Effort: Pulmonary effort is normal.      Breath sounds: Normal breath sounds.   Abdominal:      General: Bowel sounds are normal.      Palpations: Abdomen is soft.      Tenderness: There is no abdominal tenderness.   Musculoskeletal:         General: Normal range of motion.      Cervical back: Normal range of motion and neck supple.   Skin:     General: Skin is warm.      " Capillary Refill: Capillary refill takes less than 2 seconds.   Neurological:      General: No focal deficit present.      Mental Status: He is alert and oriented to person, place, and time.   Psychiatric:         Mood and Affect: Mood normal.         Thought Content: Thought content normal.        Assessment and Plan (including Health Maintenance)      Problem List  Smart Sets  Document Outside HM   :    Plan:         Health Maintenance Due   Topic Date Due    Pneumococcal Vaccines (Age 0-64) (1 - PCV) Never done    TETANUS VACCINE  Never done    COVID-19 Vaccine (2 - Mixed Product series) 10/15/2021    Influenza Vaccine (1) Never done       Problem List Items Addressed This Visit          Pulmonary    Cough in adult patient    Relevant Medications    hydrOXYzine (ATARAX) 10 mg/5 mL syrup    cetirizine (ZYRTEC) 10 MG tablet       ID    Influenza A virus present - Primary    Relevant Medications    azithromycin (Z-ALEXSANDRA) 250 MG tablet    hydrOXYzine (ATARAX) 10 mg/5 mL syrup    cetirizine (ZYRTEC) 10 MG tablet       GI    Nausea, vomiting, and diarrhea    Relevant Orders    POCT SARS-COV2 (COVID) with Flu Antigen       Health Maintenance Topics with due status: Not Due       Topic Last Completion Date    Lipid Panel 09/06/2022       Future Appointments   Date Time Provider Department Center   10/6/2022  9:15 AM Patito Reed NP [facility]   12/19/2022 10:00 AM JAY Robert [facility]            Signature:  Patito Reed NP      [address] [place] [address]    Date of encounter: 10/6/22

## 2022-10-06 NOTE — LETTER
October 6, 2022      [facility]  [address] [address]  Phone: [phone]  Fax: [phone]       Patient: Ti Hobbs   YOB: 1985  Date of Visit: 10/06/2022    To Whom It May Concern:    BETTIE Hobbs  was at [facility] on 10/06/2022. The patient may return to work/school on 10/10/2022 with no restrictions. If you have any questions or concerns, or if I can be of further assistance, please do not hesitate to contact me.    Sincerely,    Patito Reed NP

## 2022-12-19 ENCOUNTER — OFFICE VISIT (OUTPATIENT)
Dept: FAMILY MEDICINE | Facility: CLINIC | Age: 37
End: 2022-12-19
Payer: COMMERCIAL

## 2022-12-19 VITALS
TEMPERATURE: 98 F | HEIGHT: 70 IN | WEIGHT: 184 LBS | SYSTOLIC BLOOD PRESSURE: 147 MMHG | HEART RATE: 86 BPM | BODY MASS INDEX: 26.34 KG/M2 | DIASTOLIC BLOOD PRESSURE: 83 MMHG | OXYGEN SATURATION: 99 % | RESPIRATION RATE: 16 BRPM

## 2022-12-19 DIAGNOSIS — I10 ESSENTIAL HYPERTENSION, MALIGNANT: Primary | ICD-10-CM

## 2022-12-19 LAB
ALBUMIN SERPL BCP-MCNC: 2.1 G/DL (ref 3.5–5)
ALBUMIN/GLOB SERPL: 0.7 {RATIO}
ALP SERPL-CCNC: 63 U/L (ref 45–115)
ALT SERPL W P-5'-P-CCNC: 22 U/L (ref 16–61)
ANION GAP SERPL CALCULATED.3IONS-SCNC: 10 MMOL/L (ref 7–16)
AST SERPL W P-5'-P-CCNC: 32 U/L (ref 15–37)
BASOPHILS # BLD AUTO: 0.08 K/UL (ref 0–0.2)
BASOPHILS NFR BLD AUTO: 1.1 % (ref 0–1)
BILIRUB SERPL-MCNC: 0.4 MG/DL (ref ?–1.2)
BUN SERPL-MCNC: 20 MG/DL (ref 7–18)
BUN/CREAT SERPL: 17 (ref 6–20)
CALCIUM SERPL-MCNC: 8.8 MG/DL (ref 8.5–10.1)
CHLORIDE SERPL-SCNC: 109 MMOL/L (ref 98–107)
CHOLEST SERPL-MCNC: 269 MG/DL (ref 0–200)
CHOLEST/HDLC SERPL: 3 {RATIO}
CO2 SERPL-SCNC: 28 MMOL/L (ref 21–32)
CREAT SERPL-MCNC: 1.2 MG/DL (ref 0.7–1.3)
CREAT UR-MCNC: 151 MG/DL (ref 39–259)
DIFFERENTIAL METHOD BLD: ABNORMAL
EGFR (NO RACE VARIABLE) (RUSH/TITUS): 80 ML/MIN/1.73M²
EOSINOPHIL # BLD AUTO: 0.28 K/UL (ref 0–0.5)
EOSINOPHIL NFR BLD AUTO: 3.7 % (ref 1–4)
ERYTHROCYTE [DISTWIDTH] IN BLOOD BY AUTOMATED COUNT: 14 % (ref 11.5–14.5)
GLOBULIN SER-MCNC: 3.1 G/DL (ref 2–4)
GLUCOSE SERPL-MCNC: 95 MG/DL (ref 74–106)
HCT VFR BLD AUTO: 43.3 % (ref 40–54)
HDLC SERPL-MCNC: 91 MG/DL (ref 40–60)
HGB BLD-MCNC: 14.6 G/DL (ref 13.5–18)
IMM GRANULOCYTES # BLD AUTO: 0.02 K/UL (ref 0–0.04)
IMM GRANULOCYTES NFR BLD: 0.3 % (ref 0–0.4)
LDLC SERPL CALC-MCNC: 162 MG/DL
LDLC/HDLC SERPL: 1.8 {RATIO}
LYMPHOCYTES # BLD AUTO: 2.45 K/UL (ref 1–4.8)
LYMPHOCYTES NFR BLD AUTO: 32.7 % (ref 27–41)
MCH RBC QN AUTO: 31.2 PG (ref 27–31)
MCHC RBC AUTO-ENTMCNC: 33.7 G/DL (ref 32–36)
MCV RBC AUTO: 92.5 FL (ref 80–96)
MICROALBUMIN UR-MCNC: 353 MG/DL (ref 0–2.8)
MICROALBUMIN/CREAT RATIO PNL UR: 2337.7 MG/G (ref 0–30)
MONOCYTES # BLD AUTO: 0.38 K/UL (ref 0–0.8)
MONOCYTES NFR BLD AUTO: 5.1 % (ref 2–6)
MPC BLD CALC-MCNC: 9.5 FL (ref 9.4–12.4)
NEUTROPHILS # BLD AUTO: 4.28 K/UL (ref 1.8–7.7)
NEUTROPHILS NFR BLD AUTO: 57.1 % (ref 53–65)
NONHDLC SERPL-MCNC: 178 MG/DL
NRBC # BLD AUTO: 0 X10E3/UL
NRBC, AUTO (.00): 0 %
PLATELET # BLD AUTO: 436 K/UL (ref 150–400)
POTASSIUM SERPL-SCNC: 4.3 MMOL/L (ref 3.5–5.1)
PROT SERPL-MCNC: 5.2 G/DL (ref 6.4–8.2)
RBC # BLD AUTO: 4.68 M/UL (ref 4.6–6.2)
SODIUM SERPL-SCNC: 143 MMOL/L (ref 136–145)
TRIGL SERPL-MCNC: 78 MG/DL (ref 35–150)
VLDLC SERPL-MCNC: 16 MG/DL
WBC # BLD AUTO: 7.49 K/UL (ref 4.5–11)

## 2022-12-19 PROCEDURE — 3066F PR DOCUMENTATION OF TREATMENT FOR NEPHROPATHY: ICD-10-PCS | Mod: CPTII,,, | Performed by: NURSE PRACTITIONER

## 2022-12-19 PROCEDURE — 80053 COMPREHEN METABOLIC PANEL: CPT | Mod: ,,, | Performed by: CLINICAL MEDICAL LABORATORY

## 2022-12-19 PROCEDURE — 82043 MICROALBUMIN / CREATININE RATIO URINE: ICD-10-PCS | Mod: ,,, | Performed by: CLINICAL MEDICAL LABORATORY

## 2022-12-19 PROCEDURE — 3077F SYST BP >= 140 MM HG: CPT | Mod: CPTII,,, | Performed by: NURSE PRACTITIONER

## 2022-12-19 PROCEDURE — 82570 ASSAY OF URINE CREATININE: CPT | Mod: ,,, | Performed by: CLINICAL MEDICAL LABORATORY

## 2022-12-19 PROCEDURE — 3044F PR MOST RECENT HEMOGLOBIN A1C LEVEL <7.0%: ICD-10-PCS | Mod: CPTII,,, | Performed by: NURSE PRACTITIONER

## 2022-12-19 PROCEDURE — 3008F PR BODY MASS INDEX (BMI) DOCUMENTED: ICD-10-PCS | Mod: CPTII,,, | Performed by: NURSE PRACTITIONER

## 2022-12-19 PROCEDURE — 99214 PR OFFICE/OUTPT VISIT, EST, LEVL IV, 30-39 MIN: ICD-10-PCS | Mod: ,,, | Performed by: NURSE PRACTITIONER

## 2022-12-19 PROCEDURE — 80061 LIPID PANEL: ICD-10-PCS | Mod: ,,, | Performed by: CLINICAL MEDICAL LABORATORY

## 2022-12-19 PROCEDURE — 85025 COMPLETE CBC W/AUTO DIFF WBC: CPT | Mod: ,,, | Performed by: CLINICAL MEDICAL LABORATORY

## 2022-12-19 PROCEDURE — 3062F POS MACROALBUMINURIA REV: CPT | Mod: CPTII,,, | Performed by: NURSE PRACTITIONER

## 2022-12-19 PROCEDURE — 1159F PR MEDICATION LIST DOCUMENTED IN MEDICAL RECORD: ICD-10-PCS | Mod: CPTII,,, | Performed by: NURSE PRACTITIONER

## 2022-12-19 PROCEDURE — 80053 COMPREHENSIVE METABOLIC PANEL: ICD-10-PCS | Mod: ,,, | Performed by: CLINICAL MEDICAL LABORATORY

## 2022-12-19 PROCEDURE — 82570 MICROALBUMIN / CREATININE RATIO URINE: ICD-10-PCS | Mod: ,,, | Performed by: CLINICAL MEDICAL LABORATORY

## 2022-12-19 PROCEDURE — 82043 UR ALBUMIN QUANTITATIVE: CPT | Mod: ,,, | Performed by: CLINICAL MEDICAL LABORATORY

## 2022-12-19 PROCEDURE — 80061 LIPID PANEL: CPT | Mod: ,,, | Performed by: CLINICAL MEDICAL LABORATORY

## 2022-12-19 PROCEDURE — 3044F HG A1C LEVEL LT 7.0%: CPT | Mod: CPTII,,, | Performed by: NURSE PRACTITIONER

## 2022-12-19 PROCEDURE — 3077F PR MOST RECENT SYSTOLIC BLOOD PRESSURE >= 140 MM HG: ICD-10-PCS | Mod: CPTII,,, | Performed by: NURSE PRACTITIONER

## 2022-12-19 PROCEDURE — 3066F NEPHROPATHY DOC TX: CPT | Mod: CPTII,,, | Performed by: NURSE PRACTITIONER

## 2022-12-19 PROCEDURE — 3062F PR POS MACROALBUMINURIA RESULT DOCUMENTED/REVIEW: ICD-10-PCS | Mod: CPTII,,, | Performed by: NURSE PRACTITIONER

## 2022-12-19 PROCEDURE — 3008F BODY MASS INDEX DOCD: CPT | Mod: CPTII,,, | Performed by: NURSE PRACTITIONER

## 2022-12-19 PROCEDURE — 3079F PR MOST RECENT DIASTOLIC BLOOD PRESSURE 80-89 MM HG: ICD-10-PCS | Mod: CPTII,,, | Performed by: NURSE PRACTITIONER

## 2022-12-19 PROCEDURE — 3079F DIAST BP 80-89 MM HG: CPT | Mod: CPTII,,, | Performed by: NURSE PRACTITIONER

## 2022-12-19 PROCEDURE — 85025 CBC WITH DIFFERENTIAL: ICD-10-PCS | Mod: ,,, | Performed by: CLINICAL MEDICAL LABORATORY

## 2022-12-19 PROCEDURE — 1159F MED LIST DOCD IN RCRD: CPT | Mod: CPTII,,, | Performed by: NURSE PRACTITIONER

## 2022-12-19 PROCEDURE — 99214 OFFICE O/P EST MOD 30 MIN: CPT | Mod: ,,, | Performed by: NURSE PRACTITIONER

## 2022-12-19 RX ORDER — LOSARTAN POTASSIUM AND HYDROCHLOROTHIAZIDE 25; 100 MG/1; MG/1
1 TABLET ORAL DAILY
Qty: 90 TABLET | Refills: 1 | Status: SHIPPED | OUTPATIENT
Start: 2022-12-19 | End: 2023-06-13 | Stop reason: SDUPTHER

## 2022-12-19 NOTE — PROGRESS NOTES
JAY Robert   [facility] [address] [place] [address]  [phone]      PATIENT NAME: Ti Hobbs  : 1985  DATE: 22  MRN: [id number]      Billing Provider: JAY Robert  Level of Service:   Patient PCP Information       Provider PCP Type    JAY Robert General            Reason for Visit / Chief Complaint: Hypertension and Follow-up (3 mos)       Update PCP  Update Chief Complaint         History of Present Illness / Problem Focused Workflow     Ti Hobbs presents to the clinic with Hypertension and Follow-up (3 mos)     Routine follow up.    Bp is improving. Will cont home meds   Advised to monitor BP at home. Advised on optimal BP readings - SBP < 130 & DBP < 80. Advised to call office for any persistent BP elevation and may have to prescribe or adjust BP med(s).  Recommended DASH diet, stay well hydrated with water daily, eliminate or decrease caffeinated and high calorie drinks, increase physical activity, and lose weight if BMI > 25.0.        Review of Systems     Review of Systems   Constitutional:  Negative for fatigue and fever.   HENT:  Negative for nasal congestion and sore throat.    Eyes:  Negative for visual disturbance.   Respiratory:  Negative for chest tightness and shortness of breath.    Cardiovascular:  Negative for chest pain and leg swelling.   Gastrointestinal:  Negative for abdominal pain, change in bowel habit and change in bowel habit.   Endocrine: Negative for polydipsia, polyphagia and polyuria.   Genitourinary:  Negative for dysuria and hematuria.   Musculoskeletal:  Negative for back pain and leg pain.   Integumentary:  Negative for rash.   Neurological:  Negative for dizziness, syncope, weakness and light-headedness.      Medical / Social / Family History     Past Medical History:   Diagnosis Date    Hypertension        No past  surgical history on file.    Social History  Mr. Hobbs  reports that he has been smoking cigarettes. He has a 9.00 pack-year smoking history. He has never used smokeless tobacco. He reports current alcohol use. He reports current drug use. Drug: Marijuana.    Family History  Mr. Hobbs's family history includes Cancer in his maternal grandmother; Hypertension in his father and mother.    Medications and Allergies     Medications  Outpatient Medications Marked as Taking for the 12/19/22 encounter (Office Visit) with JAY Robert   Medication Sig Dispense Refill    amLODIPine (NORVASC) 10 MG tablet Take 1 tablet (10 mg total) by mouth once daily. 90 tablet 0    cetirizine (ZYRTEC) 10 MG tablet Take 1 tablet (10 mg total) by mouth once daily. 30 tablet 0       Allergies  Review of patient's allergies indicates:   Allergen Reactions    Penicillins        Physical Examination     Vitals:    12/19/22 1005   BP: (!) 147/83   Pulse: 86   Resp: 16   Temp: 97.7 °F (36.5 °C)     Physical Exam  Eyes:      Pupils: Pupils are equal, round, and reactive to light.   Cardiovascular:      Rate and Rhythm: Normal rate and regular rhythm.      Heart sounds: No murmur heard.  Pulmonary:      Breath sounds: Normal breath sounds. No wheezing, rhonchi or rales.   Abdominal:      General: Bowel sounds are normal.   Musculoskeletal:         General: No swelling.      Cervical back: Normal range of motion and neck supple.   Skin:     General: Skin is warm and dry.   Neurological:      Mental Status: He is alert and oriented to person, place, and time.        Assessment and Plan (including Health Maintenance)      Problem List  Smart Sets  Document Outside HM   :    Plan:   Cont home meds      Health Maintenance Due   Topic Date Due    Pneumococcal Vaccines (Age 0-64) (1 - PCV) Never done    TETANUS VACCINE  Never done    COVID-19 Vaccine (2 - Mixed Product series) 10/15/2021    Influenza Vaccine (1) Never done       Problem  List Items Addressed This Visit    None  Visit Diagnoses       Essential hypertension, malignant    -  Primary    much better today  cont home meds  goal 130/80    Relevant Medications    losartan-hydrochlorothiazide 100-25 mg (HYZAAR) 100-25 mg per tablet    Other Relevant Orders    CBC Auto Differential    Comprehensive Metabolic Panel    Lipid Panel    Microalbumin/Creatinine Ratio, Urine            Health Maintenance Topics with due status: Not Due       Topic Last Completion Date    Diabetes Urine Screening 09/06/2022    Lipid Panel 09/06/2022    Hemoglobin A1c 09/06/2022       Future Appointments   Date Time Provider Department Center   6/19/2023  9:40 AM JAY Robert [facility]            Signature:  JAY Robert  [facility] [address] [place] [address]  [phone]    Date of encounter: 12/19/22

## 2023-01-10 ENCOUNTER — OFFICE VISIT (OUTPATIENT)
Dept: FAMILY MEDICINE | Facility: CLINIC | Age: 38
End: 2023-01-10
Payer: COMMERCIAL

## 2023-01-10 VITALS
DIASTOLIC BLOOD PRESSURE: 95 MMHG | SYSTOLIC BLOOD PRESSURE: 146 MMHG | WEIGHT: 181 LBS | HEART RATE: 80 BPM | TEMPERATURE: 98 F | HEIGHT: 70 IN | OXYGEN SATURATION: 99 % | BODY MASS INDEX: 25.91 KG/M2 | RESPIRATION RATE: 16 BRPM

## 2023-01-10 DIAGNOSIS — M54.50 ACUTE BILATERAL LOW BACK PAIN WITHOUT SCIATICA: Primary | ICD-10-CM

## 2023-01-10 PROCEDURE — 96372 PR INJECTION,THERAP/PROPH/DIAG2ST, IM OR SUBCUT: ICD-10-PCS | Mod: ,,, | Performed by: NURSE PRACTITIONER

## 2023-01-10 PROCEDURE — 1159F MED LIST DOCD IN RCRD: CPT | Mod: CPTII,,, | Performed by: NURSE PRACTITIONER

## 2023-01-10 PROCEDURE — 3077F SYST BP >= 140 MM HG: CPT | Mod: CPTII,,, | Performed by: NURSE PRACTITIONER

## 2023-01-10 PROCEDURE — 3080F PR MOST RECENT DIASTOLIC BLOOD PRESSURE >= 90 MM HG: ICD-10-PCS | Mod: CPTII,,, | Performed by: NURSE PRACTITIONER

## 2023-01-10 PROCEDURE — 3080F DIAST BP >= 90 MM HG: CPT | Mod: CPTII,,, | Performed by: NURSE PRACTITIONER

## 2023-01-10 PROCEDURE — 1159F PR MEDICATION LIST DOCUMENTED IN MEDICAL RECORD: ICD-10-PCS | Mod: CPTII,,, | Performed by: NURSE PRACTITIONER

## 2023-01-10 PROCEDURE — 3077F PR MOST RECENT SYSTOLIC BLOOD PRESSURE >= 140 MM HG: ICD-10-PCS | Mod: CPTII,,, | Performed by: NURSE PRACTITIONER

## 2023-01-10 PROCEDURE — 99213 PR OFFICE/OUTPT VISIT, EST, LEVL III, 20-29 MIN: ICD-10-PCS | Mod: 25,,, | Performed by: NURSE PRACTITIONER

## 2023-01-10 PROCEDURE — 3008F BODY MASS INDEX DOCD: CPT | Mod: CPTII,,, | Performed by: NURSE PRACTITIONER

## 2023-01-10 PROCEDURE — 99213 OFFICE O/P EST LOW 20 MIN: CPT | Mod: 25,,, | Performed by: NURSE PRACTITIONER

## 2023-01-10 PROCEDURE — 96372 THER/PROPH/DIAG INJ SC/IM: CPT | Mod: ,,, | Performed by: NURSE PRACTITIONER

## 2023-01-10 PROCEDURE — 3008F PR BODY MASS INDEX (BMI) DOCUMENTED: ICD-10-PCS | Mod: CPTII,,, | Performed by: NURSE PRACTITIONER

## 2023-01-10 RX ORDER — TIZANIDINE 2 MG/1
2 TABLET ORAL 3 TIMES DAILY PRN
Qty: 30 TABLET | Refills: 0 | Status: SHIPPED | OUTPATIENT
Start: 2023-01-10 | End: 2023-01-20

## 2023-01-10 RX ORDER — KETOROLAC TROMETHAMINE 30 MG/ML
60 INJECTION, SOLUTION INTRAMUSCULAR; INTRAVENOUS
Status: COMPLETED | OUTPATIENT
Start: 2023-01-10 | End: 2023-01-10

## 2023-01-10 RX ADMIN — KETOROLAC TROMETHAMINE 60 MG: 30 INJECTION, SOLUTION INTRAMUSCULAR; INTRAVENOUS at 01:01

## 2023-01-10 NOTE — LETTER
January 10, 2023      [facility]  [address] [address]  Phone: [phone]  Fax: [phone]       Patient: Ti Hobbs   YOB: 1985  Date of Visit: 01/10/2023    To Whom It May Concern:    BETTIE Hobbs  was at [facility] on 01/10/2023. The patient may return to work/school on 01/12/2023  . If you have any questions or concerns, or if I can be of further assistance, please do not hesitate to contact me.    Sincerely,    JAY Robert

## 2023-01-10 NOTE — PROGRESS NOTES
JAY Robert   [facility] [address] [place] [address]  [phone]      PATIENT NAME: Ti Hobbs  : 1985  DATE: 1/10/23  MRN: [id number]      Billing Provider: JAY Robert  Level of Service:   Patient PCP Information       Provider PCP Type    JAY Robert General            Reason for Visit / Chief Complaint: Back Pain       Update PCP  Update Chief Complaint         History of Present Illness / Problem Focused Workflow     Ti Hobbs presents to the clinic with Back Pain     Pt presents for evaluation of acute onset of low back pain. He states he was out playing with his children when the pain started. He denies any known injury or heavy lifting.     Pain located in the lower back with some right hip pain. No radiation of pain down his legs, no numbness of the feet or legs. No difficulty with bowel or bladder.  Pt does have difficulty going from sitting to standing and standing to sitting.       Review of Systems     Review of Systems   Gastrointestinal:  Negative for change in bowel habit, fecal incontinence and change in bowel habit.   Genitourinary:  Negative for bladder incontinence and difficulty urinating.   Musculoskeletal:  Positive for back pain and gait problem.      Medical / Social / Family History     Past Medical History:   Diagnosis Date    Hypertension        No past surgical history on file.    Social History  Mr. Hobbs  reports that he has been smoking cigarettes. He has a 9.00 pack-year smoking history. He has never used smokeless tobacco. He reports current alcohol use. He reports current drug use. Drug: Marijuana.    Family History  Mr. Hobbs's family history includes Cancer in his maternal grandmother; Hypertension in his father and mother.    Medications and Allergies     Medications  Outpatient Medications Marked as Taking for the  1/10/23 encounter (Office Visit) with JAY Robert   Medication Sig Dispense Refill    amLODIPine (NORVASC) 10 MG tablet Take 1 tablet (10 mg total) by mouth once daily. 90 tablet 0    losartan-hydrochlorothiazide 100-25 mg (HYZAAR) 100-25 mg per tablet Take 1 tablet by mouth once daily. 90 tablet 1       Allergies  Review of patient's allergies indicates:   Allergen Reactions    Penicillins        Physical Examination     Vitals:    01/10/23 1303   BP: (!) 146/95   Pulse: 80   Resp: 16   Temp: 97.5 °F (36.4 °C)     Physical Exam  Eyes:      Pupils: Pupils are equal, round, and reactive to light.   Cardiovascular:      Rate and Rhythm: Normal rate and regular rhythm.      Heart sounds: No murmur heard.  Pulmonary:      Breath sounds: Normal breath sounds. No wheezing, rhonchi or rales.   Abdominal:      General: Bowel sounds are normal.   Musculoskeletal:         General: No swelling.      Cervical back: Normal range of motion and neck supple.   Neurological:      Mental Status: He is alert and oriented to person, place, and time.        Assessment and Plan (including Health Maintenance)      Problem List  Smart Sets  Document Outside HM   :    Plan:     Toradol  Prn meds     Health Maintenance Due   Topic Date Due    Pneumococcal Vaccines (Age 0-64) (1 - PCV) Never done    TETANUS VACCINE  Never done    COVID-19 Vaccine (2 - Mixed Product series) 10/15/2021    Influenza Vaccine (1) Never done       Problem List Items Addressed This Visit    None  Visit Diagnoses       Acute bilateral low back pain without sciatica    -  Primary    xrays     Relevant Orders    X-Ray Lumbar Spine AP And Lateral            Health Maintenance Topics with due status: Not Due       Topic Last Completion Date    Hemoglobin A1c 09/06/2022    Diabetes Urine Screening 12/19/2022    Lipid Panel 12/19/2022       Future Appointments   Date Time Provider Department Center   6/19/2023  9:40 [facility]            Signature:  Marah Huang, ACNP  [facility] [address] [place] [address]  [phone]    Date of encounter: 1/10/23

## 2023-06-19 ENCOUNTER — OFFICE VISIT (OUTPATIENT)
Dept: FAMILY MEDICINE | Facility: CLINIC | Age: 38
End: 2023-06-19
Payer: COMMERCIAL

## 2023-06-19 VITALS
HEART RATE: 79 BPM | DIASTOLIC BLOOD PRESSURE: 98 MMHG | TEMPERATURE: 97 F | WEIGHT: 177 LBS | RESPIRATION RATE: 16 BRPM | OXYGEN SATURATION: 99 % | HEIGHT: 70 IN | BODY MASS INDEX: 25.34 KG/M2 | SYSTOLIC BLOOD PRESSURE: 151 MMHG

## 2023-06-19 DIAGNOSIS — I10 ESSENTIAL HYPERTENSION, MALIGNANT: ICD-10-CM

## 2023-06-19 DIAGNOSIS — I10 ESSENTIAL HYPERTENSION, MALIGNANT: Primary | ICD-10-CM

## 2023-06-19 DIAGNOSIS — J30.2 SEASONAL ALLERGIES: ICD-10-CM

## 2023-06-19 DIAGNOSIS — Z23 TETANUS-DIPHTHERIA VACCINATION ADMINISTERED AT CURRENT VISIT: ICD-10-CM

## 2023-06-19 LAB
ALBUMIN SERPL BCP-MCNC: 2.4 G/DL (ref 3.5–5)
ALBUMIN/GLOB SERPL: 0.7 {RATIO}
ALP SERPL-CCNC: 61 U/L (ref 45–115)
ALT SERPL W P-5'-P-CCNC: 24 U/L (ref 16–61)
ANION GAP SERPL CALCULATED.3IONS-SCNC: 6 MMOL/L (ref 7–16)
AST SERPL W P-5'-P-CCNC: 32 U/L (ref 15–37)
BILIRUB SERPL-MCNC: 0.8 MG/DL (ref ?–1.2)
BUN SERPL-MCNC: 21 MG/DL (ref 7–18)
BUN/CREAT SERPL: 19 (ref 6–20)
CALCIUM SERPL-MCNC: 9.2 MG/DL (ref 8.5–10.1)
CHLORIDE SERPL-SCNC: 110 MMOL/L (ref 98–107)
CHOLEST SERPL-MCNC: 281 MG/DL (ref 0–200)
CHOLEST/HDLC SERPL: 3.1 {RATIO}
CO2 SERPL-SCNC: 29 MMOL/L (ref 21–32)
CREAT SERPL-MCNC: 1.12 MG/DL (ref 0.7–1.3)
EGFR (NO RACE VARIABLE) (RUSH/TITUS): 86 ML/MIN/1.73M2
GLOBULIN SER-MCNC: 3.6 G/DL (ref 2–4)
GLUCOSE SERPL-MCNC: 104 MG/DL (ref 74–106)
HDLC SERPL-MCNC: 92 MG/DL (ref 40–60)
LDLC SERPL CALC-MCNC: 172 MG/DL
LDLC/HDLC SERPL: 1.9 {RATIO}
NONHDLC SERPL-MCNC: 189 MG/DL
POTASSIUM SERPL-SCNC: 3.8 MMOL/L (ref 3.5–5.1)
PROT SERPL-MCNC: 6 G/DL (ref 6.4–8.2)
SODIUM SERPL-SCNC: 141 MMOL/L (ref 136–145)
TRIGL SERPL-MCNC: 87 MG/DL (ref 35–150)
VLDLC SERPL-MCNC: 17 MG/DL

## 2023-06-19 PROCEDURE — 99213 PR OFFICE/OUTPT VISIT, EST, LEVL III, 20-29 MIN: ICD-10-PCS | Mod: 25,,, | Performed by: NURSE PRACTITIONER

## 2023-06-19 PROCEDURE — 3080F DIAST BP >= 90 MM HG: CPT | Mod: CPTII,,, | Performed by: NURSE PRACTITIONER

## 2023-06-19 PROCEDURE — 80053 COMPREHEN METABOLIC PANEL: CPT | Mod: ,,, | Performed by: CLINICAL MEDICAL LABORATORY

## 2023-06-19 PROCEDURE — 90471 IMMUNIZATION ADMIN: CPT | Mod: ,,, | Performed by: NURSE PRACTITIONER

## 2023-06-19 PROCEDURE — 3008F BODY MASS INDEX DOCD: CPT | Mod: CPTII,,, | Performed by: NURSE PRACTITIONER

## 2023-06-19 PROCEDURE — 3077F PR MOST RECENT SYSTOLIC BLOOD PRESSURE >= 140 MM HG: ICD-10-PCS | Mod: CPTII,,, | Performed by: NURSE PRACTITIONER

## 2023-06-19 PROCEDURE — 1160F RVW MEDS BY RX/DR IN RCRD: CPT | Mod: CPTII,,, | Performed by: NURSE PRACTITIONER

## 2023-06-19 PROCEDURE — 3008F PR BODY MASS INDEX (BMI) DOCUMENTED: ICD-10-PCS | Mod: CPTII,,, | Performed by: NURSE PRACTITIONER

## 2023-06-19 PROCEDURE — 99213 OFFICE O/P EST LOW 20 MIN: CPT | Mod: 25,,, | Performed by: NURSE PRACTITIONER

## 2023-06-19 PROCEDURE — 90715 TDAP VACCINE 7 YRS/> IM: CPT | Mod: ,,, | Performed by: NURSE PRACTITIONER

## 2023-06-19 PROCEDURE — 90471 TDAP VACCINE GREATER THAN OR EQUAL TO 7YO IM: ICD-10-PCS | Mod: ,,, | Performed by: NURSE PRACTITIONER

## 2023-06-19 PROCEDURE — 80061 LIPID PANEL: CPT | Mod: ,,, | Performed by: CLINICAL MEDICAL LABORATORY

## 2023-06-19 PROCEDURE — 80061 LIPID PANEL: ICD-10-PCS | Mod: ,,, | Performed by: CLINICAL MEDICAL LABORATORY

## 2023-06-19 PROCEDURE — 1159F PR MEDICATION LIST DOCUMENTED IN MEDICAL RECORD: ICD-10-PCS | Mod: CPTII,,, | Performed by: NURSE PRACTITIONER

## 2023-06-19 PROCEDURE — 3080F PR MOST RECENT DIASTOLIC BLOOD PRESSURE >= 90 MM HG: ICD-10-PCS | Mod: CPTII,,, | Performed by: NURSE PRACTITIONER

## 2023-06-19 PROCEDURE — 1159F MED LIST DOCD IN RCRD: CPT | Mod: CPTII,,, | Performed by: NURSE PRACTITIONER

## 2023-06-19 PROCEDURE — 80053 COMPREHENSIVE METABOLIC PANEL: ICD-10-PCS | Mod: ,,, | Performed by: CLINICAL MEDICAL LABORATORY

## 2023-06-19 PROCEDURE — 3077F SYST BP >= 140 MM HG: CPT | Mod: CPTII,,, | Performed by: NURSE PRACTITIONER

## 2023-06-19 PROCEDURE — 1160F PR REVIEW ALL MEDS BY PRESCRIBER/CLIN PHARMACIST DOCUMENTED: ICD-10-PCS | Mod: CPTII,,, | Performed by: NURSE PRACTITIONER

## 2023-06-19 PROCEDURE — 90715 TDAP VACCINE GREATER THAN OR EQUAL TO 7YO IM: ICD-10-PCS | Mod: ,,, | Performed by: NURSE PRACTITIONER

## 2023-06-19 RX ORDER — CETIRIZINE HYDROCHLORIDE 10 MG/1
10 TABLET ORAL DAILY
Qty: 30 TABLET | Refills: 11 | Status: SHIPPED | OUTPATIENT
Start: 2023-06-19

## 2023-06-19 RX ORDER — AMLODIPINE BESYLATE 10 MG/1
10 TABLET ORAL DAILY
Qty: 90 TABLET | Refills: 1 | Status: SHIPPED | OUTPATIENT
Start: 2023-06-19 | End: 2023-12-14 | Stop reason: SDUPTHER

## 2023-06-19 RX ORDER — LOSARTAN POTASSIUM AND HYDROCHLOROTHIAZIDE 25; 100 MG/1; MG/1
1 TABLET ORAL DAILY
Qty: 90 TABLET | Refills: 1 | Status: SHIPPED | OUTPATIENT
Start: 2023-06-19 | End: 2023-12-14 | Stop reason: SDUPTHER

## 2023-06-19 NOTE — PROGRESS NOTES
JAY Robert   [facility] [address] [place] [address]  [phone]      PATIENT NAME: Ti Hobbs  : 1985  DATE: 23  MRN: [id number]      Billing Provider: JAY Robert  Level of Service:   Patient PCP Information       Provider PCP Type    JAY Robert General            Reason for Visit / Chief Complaint: Follow-up and Hypertension (6 mos)       Update PCP  Update Chief Complaint         History of Present Illness / Problem Focused Workflow     Ti Hobbs presents to the clinic with Follow-up and Hypertension (6 mos)     Pt presents for routine follow up with lab and med refills. Overall doing well.      BP appears  controlled today. Home meds reviewed  The current medical regimen is effective;  continue present plan and medications. Recommended patient to check home readings to monitor and see me for followup as scheduled or sooner as needed.   Discussed sodium restriction, maintaining ideal body weight and regular exercise program as physiologic means to continue to achieve blood pressure control in addition to medication compliance.  Patient was educated that both decongestant and anti-inflammatory medication may raise blood pressure.        Review of Systems     Review of Systems   Constitutional:  Negative for fatigue and fever.   HENT:  Negative for nasal congestion and sore throat.    Eyes:  Negative for visual disturbance.   Respiratory:  Negative for chest tightness and shortness of breath.    Cardiovascular:  Negative for chest pain and leg swelling.   Gastrointestinal:  Negative for abdominal pain, change in bowel habit and change in bowel habit.   Endocrine: Negative for polydipsia, polyphagia and polyuria.   Genitourinary:  Negative for dysuria and hematuria.   Musculoskeletal:  Negative for back pain and leg pain.   Integumentary:  Negative for rash.    Neurological:  Negative for dizziness, syncope, weakness and light-headedness.      Medical / Social / Family History     Past Medical History:   Diagnosis Date    Hypertension        No past surgical history on file.    Social History  Mr. Hobbs  reports that he has been smoking cigarettes. He has a 9.00 pack-year smoking history. He has never used smokeless tobacco. He reports current alcohol use. He reports current drug use. Drug: Marijuana.    Family History  Mr. Hobbs's family history includes Cancer in his maternal grandmother; Hypertension in his father and mother.    Medications and Allergies     Medications  No outpatient medications have been marked as taking for the 6/19/23 encounter (Office Visit) with JAY Robert.       Allergies  Review of patient's allergies indicates:   Allergen Reactions    Penicillins        Physical Examination     Vitals:    06/19/23 0953   BP: (!) 151/98   Pulse: 79   Resp: 16   Temp: 97.1 °F (36.2 °C)     Physical Exam  Eyes:      Pupils: Pupils are equal, round, and reactive to light.   Cardiovascular:      Rate and Rhythm: Normal rate and regular rhythm.      Heart sounds: No murmur heard.  Pulmonary:      Breath sounds: Normal breath sounds. No wheezing, rhonchi or rales.   Abdominal:      General: Bowel sounds are normal.   Musculoskeletal:         General: No swelling.      Cervical back: Normal range of motion and neck supple.   Neurological:      Mental Status: He is alert and oriented to person, place, and time.        Lab Results   Component Value Date    WBC 7.49 12/19/2022    HGB 14.6 12/19/2022    HCT 43.3 12/19/2022    MCV 92.5 12/19/2022     (H) 12/19/2022        Sodium   Date Value Ref Range Status   12/19/2022 143 136 - 145 mmol/L Final     Potassium   Date Value Ref Range Status   12/19/2022 4.3 3.5 - 5.1 mmol/L Final     Chloride   Date Value Ref Range Status   12/19/2022 109 (H) 98 - 107 mmol/L Final     CO2   Date Value Ref Range  Status   12/19/2022 28 21 - 32 mmol/L Final     Glucose   Date Value Ref Range Status   12/19/2022 95 74 - 106 mg/dL Final     BUN   Date Value Ref Range Status   12/19/2022 20 (H) 7 - 18 mg/dL Final     Creatinine   Date Value Ref Range Status   12/19/2022 1.20 0.70 - 1.30 mg/dL Final     Calcium   Date Value Ref Range Status   12/19/2022 8.8 8.5 - 10.1 mg/dL Final     Total Protein   Date Value Ref Range Status   12/19/2022 5.2 (L) 6.4 - 8.2 g/dL Final     Albumin   Date Value Ref Range Status   12/19/2022 2.1 (L) 3.5 - 5.0 g/dL Final     Bilirubin, Total   Date Value Ref Range Status   12/19/2022 0.4 >0.0 - 1.2 mg/dL Final     Alk Phos   Date Value Ref Range Status   12/19/2022 63 45 - 115 U/L Final     AST   Date Value Ref Range Status   12/19/2022 32 15 - 37 U/L Final     ALT   Date Value Ref Range Status   12/19/2022 22 16 - 61 U/L Final     Anion Gap   Date Value Ref Range Status   12/19/2022 10 7 - 16 mmol/L Final     eGFR   Date Value Ref Range Status   12/19/2022 80 >=60 mL/min/1.73m² Final      Lab Results   Component Value Date    HGBA1C 5.2 09/06/2022      Lab Results   Component Value Date    CHOL 269 (H) 12/19/2022    CHOL 275 (H) 09/06/2022    CHOL 352 (H) 11/29/2021     Lab Results   Component Value Date    HDL 91 (H) 12/19/2022    HDL 85 (H) 09/06/2022     (H) 11/29/2021     Lab Results   Component Value Date    LDLCALC 162 12/19/2022    LDLCALC 170 09/06/2022    LDLCALC 219 11/29/2021     No results found for: DLDL  Lab Results   Component Value Date    TRIG 78 12/19/2022    TRIG 98 09/06/2022    TRIG 131 11/29/2021     Lab Results   Component Value Date    CHOLHDL 3.0 12/19/2022    CHOLHDL 3.2 09/06/2022    CHOLHDL 3.3 11/29/2021      No results found for: TSH, B0LBWYJ, D4QYJOH, THYROIDAB, FREET4     Assessment and Plan (including Health Maintenance)      Problem List  Smart Sets  Document Outside HM   :    Plan:     1. Essential hypertension, malignant  -     Comprehensive Metabolic  Panel; Future; Expected date: 06/19/2023  -     Lipid Panel; Future; Expected date: 06/19/2023  -     amLODIPine (NORVASC) 10 MG tablet; Take 1 tablet (10 mg total) by mouth once daily.  Dispense: 90 tablet; Refill: 1  -     losartan-hydrochlorothiazide 100-25 mg (HYZAAR) 100-25 mg per tablet; Take 1 tablet by mouth once daily.  Dispense: 90 tablet; Refill: 1    2. Essential hypertension, malignant  Comments:  cont losartan/hctz 100/25mg daily and Norvasc 10mg   goal bp less than 130/80  Orders:  -     Comprehensive Metabolic Panel; Future; Expected date: 06/19/2023  -     Lipid Panel; Future; Expected date: 06/19/2023  -     amLODIPine (NORVASC) 10 MG tablet; Take 1 tablet (10 mg total) by mouth once daily.  Dispense: 90 tablet; Refill: 1  -     losartan-hydrochlorothiazide 100-25 mg (HYZAAR) 100-25 mg per tablet; Take 1 tablet by mouth once daily.  Dispense: 90 tablet; Refill: 1    3. Tetanus-diphtheria vaccination administered at current visit  -     Tdap Vaccine    4. Seasonal allergies  -     cetirizine (ZYRTEC) 10 MG tablet; Take 1 tablet (10 mg total) by mouth once daily.  Dispense: 30 tablet; Refill: 11         There are no Patient Instructions on file for this visit.     Health Maintenance Due   Topic Date Due    Pneumococcal Vaccines (Age 0-64) (1 - PCV) Never done    TETANUS VACCINE  Never done         Health Maintenance Topics with due status: Not Due       Topic Last Completion Date    Hemoglobin A1c 09/06/2022    Diabetes Urine Screening 12/19/2022    Lipid Panel 12/19/2022    Influenza Vaccine Not Due       Future Appointments   Date Time Provider Department Center   12/18/2023  9:40 AM JAY Robert [facility]            Signature:  JAY Robert  [facility] [address] [place] [address]  [phone]    Date of encounter: 6/19/23

## 2023-08-22 ENCOUNTER — PATIENT OUTREACH (OUTPATIENT)
Dept: ADMINISTRATIVE | Facility: HOSPITAL | Age: 38
End: 2023-08-22

## 2023-08-22 NOTE — PROGRESS NOTES
08/22/2023   --Chart accessed for: GAP REPORT  --Care Gaps addressed: HTN  Outreach made to patient via TELEPHONE--LEFT MESSAGE  Care Everywhere updates requested and reviewed.  Media reports reviewed.  [facility] and [facility] reviewed.  Immunization Database ([employer]/MIXX) reviewed. Vaccinations uploaded: NONE  HAC abstracted.  Next appointment 12/18/2023 . Appointment notes updated to include: LAST BP NONCOMPLIANT

## 2023-12-18 ENCOUNTER — OFFICE VISIT (OUTPATIENT)
Dept: FAMILY MEDICINE | Facility: CLINIC | Age: 38
End: 2023-12-18
Payer: COMMERCIAL

## 2023-12-18 VITALS
OXYGEN SATURATION: 99 % | SYSTOLIC BLOOD PRESSURE: 121 MMHG | WEIGHT: 178 LBS | BODY MASS INDEX: 25.48 KG/M2 | RESPIRATION RATE: 16 BRPM | TEMPERATURE: 98 F | DIASTOLIC BLOOD PRESSURE: 81 MMHG | HEART RATE: 80 BPM | HEIGHT: 70 IN

## 2023-12-18 DIAGNOSIS — I10 ESSENTIAL HYPERTENSION, MALIGNANT: Primary | ICD-10-CM

## 2023-12-18 DIAGNOSIS — R73.01 IFG (IMPAIRED FASTING GLUCOSE): ICD-10-CM

## 2023-12-18 DIAGNOSIS — I10 ESSENTIAL HYPERTENSION, MALIGNANT: ICD-10-CM

## 2023-12-18 PROBLEM — J10.1 INFLUENZA A VIRUS PRESENT: Status: RESOLVED | Noted: 2022-10-06 | Resolved: 2023-12-18

## 2023-12-18 PROBLEM — R19.7 NAUSEA, VOMITING, AND DIARRHEA: Status: RESOLVED | Noted: 2022-10-06 | Resolved: 2023-12-18

## 2023-12-18 PROBLEM — R11.2 NAUSEA, VOMITING, AND DIARRHEA: Status: RESOLVED | Noted: 2022-10-06 | Resolved: 2023-12-18

## 2023-12-18 PROBLEM — R05.9 COUGH IN ADULT PATIENT: Status: RESOLVED | Noted: 2022-10-06 | Resolved: 2023-12-18

## 2023-12-18 LAB
ALBUMIN SERPL BCP-MCNC: 2.8 G/DL (ref 3.5–5)
ALBUMIN/GLOB SERPL: 0.8 {RATIO}
ALP SERPL-CCNC: 64 U/L (ref 45–115)
ALT SERPL W P-5'-P-CCNC: 22 U/L (ref 16–61)
ANION GAP SERPL CALCULATED.3IONS-SCNC: 9 MMOL/L (ref 7–16)
AST SERPL W P-5'-P-CCNC: 27 U/L (ref 15–37)
BASOPHILS # BLD AUTO: 0.08 K/UL (ref 0–0.2)
BASOPHILS NFR BLD AUTO: 1.2 % (ref 0–1)
BILIRUB SERPL-MCNC: 0.8 MG/DL (ref ?–1.2)
BUN SERPL-MCNC: 15 MG/DL (ref 7–18)
BUN/CREAT SERPL: 13 (ref 6–20)
CALCIUM SERPL-MCNC: 9.6 MG/DL (ref 8.5–10.1)
CHLORIDE SERPL-SCNC: 106 MMOL/L (ref 98–107)
CHOLEST SERPL-MCNC: 256 MG/DL (ref 0–200)
CHOLEST/HDLC SERPL: 2.9 {RATIO}
CO2 SERPL-SCNC: 30 MMOL/L (ref 21–32)
CREAT SERPL-MCNC: 1.13 MG/DL (ref 0.7–1.3)
CREAT UR-MCNC: 164 MG/DL (ref 39–259)
DIFFERENTIAL METHOD BLD: ABNORMAL
EGFR (NO RACE VARIABLE) (RUSH/TITUS): 85 ML/MIN/1.73M2
EOSINOPHIL # BLD AUTO: 0.22 K/UL (ref 0–0.5)
EOSINOPHIL NFR BLD AUTO: 3.3 % (ref 1–4)
ERYTHROCYTE [DISTWIDTH] IN BLOOD BY AUTOMATED COUNT: 13.2 % (ref 11.5–14.5)
EST. AVERAGE GLUCOSE BLD GHB EST-MCNC: 103 MG/DL
GLOBULIN SER-MCNC: 3.7 G/DL (ref 2–4)
GLUCOSE SERPL-MCNC: 53 MG/DL (ref 74–106)
HBA1C MFR BLD HPLC: 5.2 % (ref 4.5–6.6)
HCT VFR BLD AUTO: 46.6 % (ref 40–54)
HDLC SERPL-MCNC: 88 MG/DL (ref 40–60)
HGB BLD-MCNC: 16.1 G/DL (ref 13.5–18)
IMM GRANULOCYTES # BLD AUTO: 0.02 K/UL (ref 0–0.04)
IMM GRANULOCYTES NFR BLD: 0.3 % (ref 0–0.4)
LDLC SERPL CALC-MCNC: 157 MG/DL
LDLC/HDLC SERPL: 1.8 {RATIO}
LYMPHOCYTES # BLD AUTO: 2.15 K/UL (ref 1–4.8)
LYMPHOCYTES NFR BLD AUTO: 32.3 % (ref 27–41)
MCH RBC QN AUTO: 31.3 PG (ref 27–31)
MCHC RBC AUTO-ENTMCNC: 34.5 G/DL (ref 32–36)
MCV RBC AUTO: 90.7 FL (ref 80–96)
MICROALBUMIN UR-MCNC: 364 MG/DL (ref 0–2.8)
MICROALBUMIN/CREAT RATIO PNL UR: 2219.5 MG/G (ref 0–30)
MONOCYTES # BLD AUTO: 0.35 K/UL (ref 0–0.8)
MONOCYTES NFR BLD AUTO: 5.3 % (ref 2–6)
MPC BLD CALC-MCNC: 9.6 FL (ref 9.4–12.4)
NEUTROPHILS # BLD AUTO: 3.83 K/UL (ref 1.8–7.7)
NEUTROPHILS NFR BLD AUTO: 57.6 % (ref 53–65)
NONHDLC SERPL-MCNC: 168 MG/DL
NRBC # BLD AUTO: 0 X10E3/UL
NRBC, AUTO (.00): 0 %
PLATELET # BLD AUTO: 394 K/UL (ref 150–400)
POTASSIUM SERPL-SCNC: 4.1 MMOL/L (ref 3.5–5.1)
PROT SERPL-MCNC: 6.5 G/DL (ref 6.4–8.2)
RBC # BLD AUTO: 5.14 M/UL (ref 4.6–6.2)
SODIUM SERPL-SCNC: 141 MMOL/L (ref 136–145)
TRIGL SERPL-MCNC: 56 MG/DL (ref 35–150)
VLDLC SERPL-MCNC: 11 MG/DL
WBC # BLD AUTO: 6.65 K/UL (ref 4.5–11)

## 2023-12-18 PROCEDURE — 3079F PR MOST RECENT DIASTOLIC BLOOD PRESSURE 80-89 MM HG: ICD-10-PCS | Mod: CPTII,,, | Performed by: NURSE PRACTITIONER

## 2023-12-18 PROCEDURE — 83036 HEMOGLOBIN GLYCOSYLATED A1C: CPT | Mod: ,,, | Performed by: CLINICAL MEDICAL LABORATORY

## 2023-12-18 PROCEDURE — 1159F PR MEDICATION LIST DOCUMENTED IN MEDICAL RECORD: ICD-10-PCS | Mod: CPTII,,, | Performed by: NURSE PRACTITIONER

## 2023-12-18 PROCEDURE — 80053 COMPREHENSIVE METABOLIC PANEL: ICD-10-PCS | Mod: ,,, | Performed by: CLINICAL MEDICAL LABORATORY

## 2023-12-18 PROCEDURE — 1159F MED LIST DOCD IN RCRD: CPT | Mod: CPTII,,, | Performed by: NURSE PRACTITIONER

## 2023-12-18 PROCEDURE — 99212 PR OFFICE/OUTPT VISIT, EST, LEVL II, 10-19 MIN: ICD-10-PCS | Mod: ,,, | Performed by: NURSE PRACTITIONER

## 2023-12-18 PROCEDURE — 3074F SYST BP LT 130 MM HG: CPT | Mod: CPTII,,, | Performed by: NURSE PRACTITIONER

## 2023-12-18 PROCEDURE — 82570 ASSAY OF URINE CREATININE: CPT | Mod: ,,, | Performed by: CLINICAL MEDICAL LABORATORY

## 2023-12-18 PROCEDURE — 80061 LIPID PANEL: CPT | Mod: ,,, | Performed by: CLINICAL MEDICAL LABORATORY

## 2023-12-18 PROCEDURE — 3074F PR MOST RECENT SYSTOLIC BLOOD PRESSURE < 130 MM HG: ICD-10-PCS | Mod: CPTII,,, | Performed by: NURSE PRACTITIONER

## 2023-12-18 PROCEDURE — 85025 COMPLETE CBC W/AUTO DIFF WBC: CPT | Mod: ,,, | Performed by: CLINICAL MEDICAL LABORATORY

## 2023-12-18 PROCEDURE — 83036 HEMOGLOBIN A1C: ICD-10-PCS | Mod: ,,, | Performed by: CLINICAL MEDICAL LABORATORY

## 2023-12-18 PROCEDURE — 1160F PR REVIEW ALL MEDS BY PRESCRIBER/CLIN PHARMACIST DOCUMENTED: ICD-10-PCS | Mod: CPTII,,, | Performed by: NURSE PRACTITIONER

## 2023-12-18 PROCEDURE — 82043 MICROALBUMIN / CREATININE RATIO URINE: ICD-10-PCS | Mod: ,,, | Performed by: CLINICAL MEDICAL LABORATORY

## 2023-12-18 PROCEDURE — 85025 CBC WITH DIFFERENTIAL: ICD-10-PCS | Mod: ,,, | Performed by: CLINICAL MEDICAL LABORATORY

## 2023-12-18 PROCEDURE — 3079F DIAST BP 80-89 MM HG: CPT | Mod: CPTII,,, | Performed by: NURSE PRACTITIONER

## 2023-12-18 PROCEDURE — 80053 COMPREHEN METABOLIC PANEL: CPT | Mod: ,,, | Performed by: CLINICAL MEDICAL LABORATORY

## 2023-12-18 PROCEDURE — 82043 UR ALBUMIN QUANTITATIVE: CPT | Mod: ,,, | Performed by: CLINICAL MEDICAL LABORATORY

## 2023-12-18 PROCEDURE — 3008F BODY MASS INDEX DOCD: CPT | Mod: CPTII,,, | Performed by: NURSE PRACTITIONER

## 2023-12-18 PROCEDURE — 80061 LIPID PANEL: ICD-10-PCS | Mod: ,,, | Performed by: CLINICAL MEDICAL LABORATORY

## 2023-12-18 PROCEDURE — 3008F PR BODY MASS INDEX (BMI) DOCUMENTED: ICD-10-PCS | Mod: CPTII,,, | Performed by: NURSE PRACTITIONER

## 2023-12-18 PROCEDURE — 1160F RVW MEDS BY RX/DR IN RCRD: CPT | Mod: CPTII,,, | Performed by: NURSE PRACTITIONER

## 2023-12-18 PROCEDURE — 99212 OFFICE O/P EST SF 10 MIN: CPT | Mod: ,,, | Performed by: NURSE PRACTITIONER

## 2023-12-18 PROCEDURE — 82570 MICROALBUMIN / CREATININE RATIO URINE: ICD-10-PCS | Mod: ,,, | Performed by: CLINICAL MEDICAL LABORATORY

## 2023-12-18 RX ORDER — LOSARTAN POTASSIUM AND HYDROCHLOROTHIAZIDE 25; 100 MG/1; MG/1
1 TABLET ORAL DAILY
Qty: 90 TABLET | Refills: 1 | Status: SHIPPED | OUTPATIENT
Start: 2023-12-18

## 2023-12-18 RX ORDER — AMLODIPINE BESYLATE 10 MG/1
10 TABLET ORAL DAILY
Qty: 90 TABLET | Refills: 1 | Status: SHIPPED | OUTPATIENT
Start: 2023-12-18

## 2023-12-18 NOTE — PROGRESS NOTES
JAY Robert   [facility] [address] [place] [address]  [phone]      PATIENT NAME: Ti Hobbs  : 1985  DATE: 23  MRN: [id number]      Billing Provider: JAY Robert  Level of Service:   Patient PCP Information       Provider PCP Type    JAY Robert General            Reason for Visit / Chief Complaint: Follow-up and Hypertension (6 mos)       Update PCP  Update Chief Complaint         History of Present Illness / Problem Focused Workflow     Ti Hobbs presents to the clinic with Follow-up and Hypertension (6 mos)     Pt presents for routine follow up with lab and med refills. Overall doing well.      BP appears  controlled today. Home meds reviewed  The current medical regimen is effective;  continue present plan and medications. Recommended patient to check home readings to monitor and see me for followup as scheduled or sooner as needed.   Discussed sodium restriction, maintaining ideal body weight and regular exercise program as physiologic means to continue to achieve blood pressure control in addition to medication compliance.  Patient was educated that both decongestant and anti-inflammatory medication may raise blood pressure.  Advised to monitor BP at home. Advised on optimal BP readings - SBP < 130 & DBP < 80. Advised to call office for any persistent BP elevation and may have to prescribe or adjust BP med(s).  Recommended DASH diet, stay well hydrated with water daily, eliminate or decrease caffeinated and high calorie drinks, increase physical activity, and lose weight if BMI > 25.0.          Review of Systems     Review of Systems   Constitutional:  Negative for fatigue and fever.   HENT:  Negative for nasal congestion and sore throat.    Eyes:  Negative for visual disturbance.   Respiratory:  Negative for chest tightness and shortness of breath.     Cardiovascular:  Negative for chest pain and leg swelling.   Gastrointestinal:  Negative for abdominal pain and change in bowel habit.   Endocrine: Negative for polydipsia, polyphagia and polyuria.   Genitourinary:  Negative for dysuria and hematuria.   Musculoskeletal:  Negative for back pain and leg pain.   Integumentary:  Negative for rash.   Neurological:  Negative for dizziness, syncope, weakness and light-headedness.        Medical / Social / Family History     Past Medical History:   Diagnosis Date    Hypertension        No past surgical history on file.    Social History  Mr. Hobbs  reports that he has been smoking cigarettes. He has a 9.0 pack-year smoking history. He has never used smokeless tobacco. He reports current alcohol use. He reports current drug use. Drug: Marijuana.    Family History  Mr. Hobbs's family history includes Cancer in his maternal grandmother; Hypertension in his father and mother.    Medications and Allergies     Medications  No outpatient medications have been marked as taking for the 12/18/23 encounter (Office Visit) with Marah Huang ACNP.       Allergies  Review of patient's allergies indicates:   Allergen Reactions    Penicillins        Physical Examination     Vitals:    12/18/23 1014   BP: 121/81   Pulse:    Resp:    Temp:      Physical Exam     Lab Results   Component Value Date    WBC 7.49 12/19/2022    HGB 14.6 12/19/2022    HCT 43.3 12/19/2022    MCV 92.5 12/19/2022     (H) 12/19/2022        Sodium   Date Value Ref Range Status   06/19/2023 141 136 - 145 mmol/L Final     Potassium   Date Value Ref Range Status   06/19/2023 3.8 3.5 - 5.1 mmol/L Final     Chloride   Date Value Ref Range Status   06/19/2023 110 (H) 98 - 107 mmol/L Final     CO2   Date Value Ref Range Status   06/19/2023 29 21 - 32 mmol/L Final     Glucose   Date Value Ref Range Status   06/19/2023 104 74 - 106 mg/dL Final     BUN   Date Value Ref Range Status   06/19/2023 21 (H) 7 - 18  "mg/dL Final     Creatinine   Date Value Ref Range Status   06/19/2023 1.12 0.70 - 1.30 mg/dL Final     Calcium   Date Value Ref Range Status   06/19/2023 9.2 8.5 - 10.1 mg/dL Final     Total Protein   Date Value Ref Range Status   06/19/2023 6.0 (L) 6.4 - 8.2 g/dL Final     Albumin   Date Value Ref Range Status   06/19/2023 2.4 (L) 3.5 - 5.0 g/dL Final     Bilirubin, Total   Date Value Ref Range Status   06/19/2023 0.8 >0.0 - 1.2 mg/dL Final     Alk Phos   Date Value Ref Range Status   06/19/2023 61 45 - 115 U/L Final     AST   Date Value Ref Range Status   06/19/2023 32 15 - 37 U/L Final     ALT   Date Value Ref Range Status   06/19/2023 24 16 - 61 U/L Final     Anion Gap   Date Value Ref Range Status   06/19/2023 6 (L) 7 - 16 mmol/L Final     eGFR   Date Value Ref Range Status   06/19/2023 86 >=60 mL/min/1.73m2 Final      Lab Results   Component Value Date    HGBA1C 5.2 09/06/2022      Lab Results   Component Value Date    CHOL 281 (H) 06/19/2023    CHOL 269 (H) 12/19/2022    CHOL 275 (H) 09/06/2022     Lab Results   Component Value Date    HDL 92 (H) 06/19/2023    HDL 91 (H) 12/19/2022    HDL 85 (H) 09/06/2022     Lab Results   Component Value Date    LDLCALC 172 06/19/2023    LDLCALC 162 12/19/2022    LDLCALC 170 09/06/2022     No results found for: "DLDL"  Lab Results   Component Value Date    TRIG 87 06/19/2023    TRIG 78 12/19/2022    TRIG 98 09/06/2022     Lab Results   Component Value Date    CHOLHDL 3.1 06/19/2023    CHOLHDL 3.0 12/19/2022    CHOLHDL 3.2 09/06/2022      No results found for: "TSH", "A9FIKUW", "M1HYMBL", "THYROIDAB", "FREET4"     Assessment and Plan (including Health Maintenance)      Problem List  Smart Sets  Document Outside HM   :    Plan:     1. Essential hypertension, malignant  Assessment & Plan:  BP Readings from Last 3 Encounters:   12/18/23 121/81   06/19/23 (!) 151/98   01/10/23 (!) 146/95    The current medical regimen is effective;  continue present plan and " medications.      Orders:  -     CBC Auto Differential; Future; Expected date: 12/18/2023  -     Comprehensive Metabolic Panel; Future; Expected date: 12/18/2023  -     Lipid Panel; Future; Expected date: 12/18/2023  -     Microalbumin/Creatinine Ratio, Urine; Future; Expected date: 12/18/2023  -     amLODIPine (NORVASC) 10 MG tablet; Take 1 tablet (10 mg total) by mouth once daily.  Dispense: 90 tablet; Refill: 1  -     losartan-hydrochlorothiazide 100-25 mg (HYZAAR) 100-25 mg per tablet; Take 1 tablet by mouth once daily.  Dispense: 90 tablet; Refill: 1    2. IFG (impaired fasting glucose)  -     Hemoglobin A1C; Future; Expected date: 12/18/2023    3. Essential hypertension, malignant  Comments:  cont losartan/hctz 100/25mg daily and Norvasc 10mg   goal bp less than 130/80  Assessment & Plan:  BP Readings from Last 3 Encounters:   12/18/23 121/81   06/19/23 (!) 151/98   01/10/23 (!) 146/95    The current medical regimen is effective;  continue present plan and medications.      Orders:  -     CBC Auto Differential; Future; Expected date: 12/18/2023  -     Comprehensive Metabolic Panel; Future; Expected date: 12/18/2023  -     Lipid Panel; Future; Expected date: 12/18/2023  -     Microalbumin/Creatinine Ratio, Urine; Future; Expected date: 12/18/2023  -     amLODIPine (NORVASC) 10 MG tablet; Take 1 tablet (10 mg total) by mouth once daily.  Dispense: 90 tablet; Refill: 1  -     losartan-hydrochlorothiazide 100-25 mg (HYZAAR) 100-25 mg per tablet; Take 1 tablet by mouth once daily.  Dispense: 90 tablet; Refill: 1         There are no Patient Instructions on file for this visit.     Health Maintenance Due   Topic Date Due    Hemoglobin A1c  09/06/2023    Diabetes Urine Screening  12/19/2023         Health Maintenance Topics with due status: Not Due       Topic Last Completion Date    TETANUS VACCINE 06/19/2023    Lipid Panel 06/19/2023       Future Appointments   Date Time Provider Department Center   6/24/2024  9:00  Marah Yusuf ACNP [facility]            Signature:  JAY Robert [facility] [address] [place] [address]  [phone]    Date of encounter: 12/18/23

## 2023-12-18 NOTE — ASSESSMENT & PLAN NOTE
BP Readings from Last 3 Encounters:   12/18/23 121/81   06/19/23 (!) 151/98   01/10/23 (!) 146/95    The current medical regimen is effective;  continue present plan and medications.

## 2023-12-19 DIAGNOSIS — N18.9 CHRONIC KIDNEY DISEASE, UNSPECIFIED CKD STAGE: ICD-10-CM

## 2023-12-19 DIAGNOSIS — I10 ESSENTIAL HYPERTENSION, MALIGNANT: Primary | ICD-10-CM

## 2024-03-26 ENCOUNTER — TELEPHONE (OUTPATIENT)
Dept: FAMILY MEDICINE | Facility: CLINIC | Age: 39
End: 2024-03-26
Payer: COMMERCIAL

## 2024-03-26 NOTE — TELEPHONE ENCOUNTER
----- Message from Spring Romero sent at 3/26/2024 11:08 AM CDT -----  #PATIENT IS NEEDING TO SPEAK WITH DOCTOR DIDNT WANT TO TELL ME WHAT HE WANTED [phone]

## 2024-03-28 ENCOUNTER — TELEPHONE (OUTPATIENT)
Dept: FAMILY MEDICINE | Facility: CLINIC | Age: 39
End: 2024-03-28
Payer: COMMERCIAL

## 2024-03-28 ENCOUNTER — OFFICE VISIT (OUTPATIENT)
Dept: FAMILY MEDICINE | Facility: CLINIC | Age: 39
End: 2024-03-28
Payer: COMMERCIAL

## 2024-03-28 VITALS
BODY MASS INDEX: 26.05 KG/M2 | HEIGHT: 70 IN | OXYGEN SATURATION: 99 % | SYSTOLIC BLOOD PRESSURE: 148 MMHG | WEIGHT: 182 LBS | HEART RATE: 103 BPM | DIASTOLIC BLOOD PRESSURE: 91 MMHG | TEMPERATURE: 99 F | RESPIRATION RATE: 16 BRPM

## 2024-03-28 DIAGNOSIS — Z91.89 AT RISK FOR SEXUALLY TRANSMITTED DISEASE DUE TO UNPROTECTED SEX: Primary | ICD-10-CM

## 2024-03-28 LAB
BILIRUB SERPL-MCNC: ABNORMAL MG/DL
BLOOD URINE, POC: ABNORMAL
COLOR, POC UA: YELLOW
GLUCOSE UR QL STRIP: ABNORMAL
KETONES UR QL STRIP: ABNORMAL
LEUKOCYTE ESTERASE URINE, POC: ABNORMAL
NITRITE, POC UA: ABNORMAL
PH, POC UA: 6
PROTEIN, POC: 100
SPECIFIC GRAVITY, POC UA: 1.02
UROBILINOGEN, POC UA: 0.2

## 2024-03-28 PROCEDURE — 87661 TRICHOMONAS VAGINALIS AMPLIF: CPT | Mod: ,,, | Performed by: CLINICAL MEDICAL LABORATORY

## 2024-03-28 PROCEDURE — 81003 URINALYSIS AUTO W/O SCOPE: CPT | Mod: QW,,, | Performed by: NURSE PRACTITIONER

## 2024-03-28 PROCEDURE — 87086 URINE CULTURE/COLONY COUNT: CPT | Mod: ,,, | Performed by: CLINICAL MEDICAL LABORATORY

## 2024-03-28 PROCEDURE — 87491 CHLMYD TRACH DNA AMP PROBE: CPT | Mod: ,,, | Performed by: CLINICAL MEDICAL LABORATORY

## 2024-03-28 PROCEDURE — 87591 N.GONORRHOEAE DNA AMP PROB: CPT | Mod: ,,, | Performed by: CLINICAL MEDICAL LABORATORY

## 2024-03-28 PROCEDURE — 99213 OFFICE O/P EST LOW 20 MIN: CPT | Mod: ,,, | Performed by: NURSE PRACTITIONER

## 2024-03-28 NOTE — TELEPHONE ENCOUNTER
----- Message from Sona Gore sent at 3/28/2024  9:37 AM CDT -----  Pts girlfriend has BV and has been treated twice with antibiotics. He is asking if he needs to be treated. [phone].

## 2024-03-28 NOTE — PROGRESS NOTES
"   JAY Robert   [facility] [address] [place] [address]  [phone]      PATIENT NAME: Ti Hobbs  : 1985  DATE: 3/28/24  MRN: [id number]      Billing Provider: JAY Robert  Level of Service:   Patient PCP Information       Provider PCP Type    JAY Robert General            Reason for Visit / Chief Complaint: Hematuria       Update PCP  Update Chief Complaint         History of Present Illness / Problem Focused Workflow     Ti Hobbs presents to the clinic with Hematuria     Pt presents for STD testing. Pt reports recent possible exposure to std. Current no signs of infection.   Discussed importance of "safe sex" and importance of protected yourself from risk.   Avoid ETOH while on antibiotics  Abstain from sexual activity while on antibiotics  RTC if s/s do not resolve or return     Ua noted, will send for cultures          Review of Systems     Review of Systems   Constitutional:  Negative for fatigue and fever.   Gastrointestinal:  Negative for abdominal pain, nausea and vomiting.   Genitourinary:  Negative for difficulty urinating, erectile dysfunction, frequency and genital sores.        Medical / Social / Family History     Past Medical History:   Diagnosis Date    Hypertension        No past surgical history on file.    Social History  Mr. Hobbs  reports that he has been smoking cigarettes. He has a 9.0 pack-year smoking history. He has never used smokeless tobacco. He reports current alcohol use. He reports current drug use. Drug: Marijuana.    Family History  Mr. Hobbs's family history includes Cancer in his maternal grandmother; Hypertension in his father and mother.    Medications and Allergies     Medications  Outpatient Medications Marked as Taking for the 3/28/24 encounter (Office Visit) with Marah Huang ACNP   Medication Sig Dispense " Refill    amLODIPine (NORVASC) 10 MG tablet Take 1 tablet (10 mg total) by mouth once daily. 90 tablet 1    losartan-hydrochlorothiazide 100-25 mg (HYZAAR) 100-25 mg per tablet Take 1 tablet by mouth once daily. 90 tablet 1       Allergies  Review of patient's allergies indicates:   Allergen Reactions    Penicillins        Physical Examination     Vitals:    03/28/24 1328   BP: (!) 148/91   Pulse: 103   Resp: 16   Temp: 98.5 °F (36.9 °C)     Physical Exam  Eyes:      Pupils: Pupils are equal, round, and reactive to light.   Cardiovascular:      Rate and Rhythm: Normal rate and regular rhythm.      Heart sounds: Normal heart sounds. No murmur heard.  Pulmonary:      Breath sounds: Normal breath sounds. No wheezing, rhonchi or rales.   Abdominal:      General: Bowel sounds are normal.   Musculoskeletal:         General: No swelling.      Cervical back: Normal range of motion and neck supple.   Skin:     General: Skin is warm and dry.   Neurological:      Mental Status: He is alert and oriented to person, place, and time.          Lab Results   Component Value Date    WBC 6.65 12/18/2023    HGB 16.1 12/18/2023    HCT 46.6 12/18/2023    MCV 90.7 12/18/2023     12/18/2023        Sodium   Date Value Ref Range Status   12/18/2023 141 136 - 145 mmol/L Final     Potassium   Date Value Ref Range Status   12/18/2023 4.1 3.5 - 5.1 mmol/L Final     Chloride   Date Value Ref Range Status   12/18/2023 106 98 - 107 mmol/L Final     CO2   Date Value Ref Range Status   12/18/2023 30 21 - 32 mmol/L Final     Glucose   Date Value Ref Range Status   12/18/2023 53 (L) 74 - 106 mg/dL Final     BUN   Date Value Ref Range Status   12/18/2023 15 7 - 18 mg/dL Final     Creatinine   Date Value Ref Range Status   12/18/2023 1.13 0.70 - 1.30 mg/dL Final     Calcium   Date Value Ref Range Status   12/18/2023 9.6 8.5 - 10.1 mg/dL Final     Total Protein   Date Value Ref Range Status   12/18/2023 6.5 6.4 - 8.2 g/dL Final     Albumin   Date  "Value Ref Range Status   12/18/2023 2.8 (L) 3.5 - 5.0 g/dL Final     Bilirubin, Total   Date Value Ref Range Status   12/18/2023 0.8 >0.0 - 1.2 mg/dL Final     Alk Phos   Date Value Ref Range Status   12/18/2023 64 45 - 115 U/L Final     AST   Date Value Ref Range Status   12/18/2023 27 15 - 37 U/L Final     ALT   Date Value Ref Range Status   12/18/2023 22 16 - 61 U/L Final     Anion Gap   Date Value Ref Range Status   12/18/2023 9 7 - 16 mmol/L Final     eGFR   Date Value Ref Range Status   12/18/2023 85 >=60 mL/min/1.73m2 Final      Lab Results   Component Value Date    HGBA1C 5.2 12/18/2023      Lab Results   Component Value Date    CHOL 256 (H) 12/18/2023    CHOL 281 (H) 06/19/2023    CHOL 269 (H) 12/19/2022     Lab Results   Component Value Date    HDL 88 (H) 12/18/2023    HDL 92 (H) 06/19/2023    HDL 91 (H) 12/19/2022     Lab Results   Component Value Date    LDLCALC 157 12/18/2023    LDLCALC 172 06/19/2023    LDLCALC 162 12/19/2022     No results found for: "DLDL"  Lab Results   Component Value Date    TRIG 56 12/18/2023    TRIG 87 06/19/2023    TRIG 78 12/19/2022     Lab Results   Component Value Date    CHOLHDL 2.9 12/18/2023    CHOLHDL 3.1 06/19/2023    CHOLHDL 3.0 12/19/2022      No results found for: "TSH", "Q4AAESE", "C2JGAHS", "THYROIDAB", "FREET4"     Assessment and Plan (including Health Maintenance)      Problem List  Smart Sets  Document Outside HM   :    Plan:     1. At risk for sexually transmitted disease due to unprotected sex  Comments:  send for cultures  Orders:  -     POCT URINALYSIS W/O SCOPE  -     Chlamydia/GC, PCR  -     Trichomonas vaginalis by PCR; Future; Expected date: 03/28/2024  -     Urine culture; Future; Expected date: 03/28/2024         There are no Patient Instructions on file for this visit.     There are no preventive care reminders to display for this patient.      Health Maintenance Topics with due status: Not Due       Topic Last Completion Date    TETANUS VACCINE " 06/19/2023    Diabetes Urine Screening 12/18/2023    Lipid Panel 12/18/2023    Hemoglobin A1c 12/18/2023       Future Appointments   Date Time Provider Department Center   6/24/2024  9:00 AM [facility]   8/1/2024  3:20 PM Sofi Burris DO [facility]            Signature:  JAY Robert [facility] [address] [place] [address]  [phone]    Date of encounter: 3/28/24

## 2024-03-29 LAB
CHLAMYDIA BY PCR: NEGATIVE
N. GONORRHOEAE (GC) BY PCR: NEGATIVE
TRICHOMONAS NAT: NEGATIVE

## 2024-03-30 LAB — UA COMPLETE W REFLEX CULTURE PNL UR: NO GROWTH

## 2024-06-24 ENCOUNTER — OFFICE VISIT (OUTPATIENT)
Dept: FAMILY MEDICINE | Facility: CLINIC | Age: 39
End: 2024-06-24
Payer: COMMERCIAL

## 2024-06-24 VITALS
HEIGHT: 70 IN | TEMPERATURE: 98 F | BODY MASS INDEX: 25.34 KG/M2 | HEART RATE: 74 BPM | DIASTOLIC BLOOD PRESSURE: 97 MMHG | RESPIRATION RATE: 16 BRPM | SYSTOLIC BLOOD PRESSURE: 160 MMHG | WEIGHT: 177 LBS | OXYGEN SATURATION: 99 %

## 2024-06-24 DIAGNOSIS — I10 ESSENTIAL HYPERTENSION, MALIGNANT: Primary | ICD-10-CM

## 2024-06-24 DIAGNOSIS — E78.2 MIXED HYPERLIPIDEMIA: ICD-10-CM

## 2024-06-24 LAB
ALBUMIN SERPL BCP-MCNC: 2.6 G/DL (ref 3.5–5)
ALBUMIN/GLOB SERPL: 0.7 {RATIO}
ALP SERPL-CCNC: 65 U/L (ref 45–115)
ALT SERPL W P-5'-P-CCNC: 20 U/L (ref 16–61)
ANION GAP SERPL CALCULATED.3IONS-SCNC: 9 MMOL/L (ref 7–16)
AST SERPL W P-5'-P-CCNC: 23 U/L (ref 15–37)
BASOPHILS # BLD AUTO: 0.09 K/UL (ref 0–0.2)
BASOPHILS NFR BLD AUTO: 1.2 % (ref 0–1)
BILIRUB SERPL-MCNC: 0.7 MG/DL (ref ?–1.2)
BUN SERPL-MCNC: 14 MG/DL (ref 7–18)
BUN/CREAT SERPL: 14 (ref 6–20)
CALCIUM SERPL-MCNC: 9.2 MG/DL (ref 8.5–10.1)
CHLORIDE SERPL-SCNC: 106 MMOL/L (ref 98–107)
CHOLEST SERPL-MCNC: 258 MG/DL (ref 0–200)
CHOLEST/HDLC SERPL: 2.7 {RATIO}
CO2 SERPL-SCNC: 27 MMOL/L (ref 21–32)
CREAT SERPL-MCNC: 1 MG/DL (ref 0.7–1.3)
DIFFERENTIAL METHOD BLD: ABNORMAL
EGFR (NO RACE VARIABLE) (RUSH/TITUS): 98 ML/MIN/1.73M2
EOSINOPHIL # BLD AUTO: 0.35 K/UL (ref 0–0.5)
EOSINOPHIL NFR BLD AUTO: 4.7 % (ref 1–4)
ERYTHROCYTE [DISTWIDTH] IN BLOOD BY AUTOMATED COUNT: 14.2 % (ref 11.5–14.5)
GLOBULIN SER-MCNC: 3.7 G/DL (ref 2–4)
GLUCOSE SERPL-MCNC: 84 MG/DL (ref 74–106)
HCT VFR BLD AUTO: 47.1 % (ref 40–54)
HDLC SERPL-MCNC: 96 MG/DL (ref 40–60)
HGB BLD-MCNC: 15.8 G/DL (ref 13.5–18)
IMM GRANULOCYTES # BLD AUTO: 0.01 K/UL (ref 0–0.04)
IMM GRANULOCYTES NFR BLD: 0.1 % (ref 0–0.4)
LDLC SERPL CALC-MCNC: 151 MG/DL
LDLC/HDLC SERPL: 1.6 {RATIO}
LYMPHOCYTES # BLD AUTO: 2.51 K/UL (ref 1–4.8)
LYMPHOCYTES NFR BLD AUTO: 33.6 % (ref 27–41)
MCH RBC QN AUTO: 31.3 PG (ref 27–31)
MCHC RBC AUTO-ENTMCNC: 33.5 G/DL (ref 32–36)
MCV RBC AUTO: 93.3 FL (ref 80–96)
MONOCYTES # BLD AUTO: 0.51 K/UL (ref 0–0.8)
MONOCYTES NFR BLD AUTO: 6.8 % (ref 2–6)
MPC BLD CALC-MCNC: 9.8 FL (ref 9.4–12.4)
NEUTROPHILS # BLD AUTO: 4.01 K/UL (ref 1.8–7.7)
NEUTROPHILS NFR BLD AUTO: 53.6 % (ref 53–65)
NONHDLC SERPL-MCNC: 162 MG/DL
NRBC # BLD AUTO: 0 X10E3/UL
NRBC, AUTO (.00): 0 %
PLATELET # BLD AUTO: 412 K/UL (ref 150–400)
POTASSIUM SERPL-SCNC: 3.9 MMOL/L (ref 3.5–5.1)
PROT SERPL-MCNC: 6.3 G/DL (ref 6.4–8.2)
RBC # BLD AUTO: 5.05 M/UL (ref 4.6–6.2)
SODIUM SERPL-SCNC: 138 MMOL/L (ref 136–145)
TRIGL SERPL-MCNC: 54 MG/DL (ref 35–150)
VLDLC SERPL-MCNC: 11 MG/DL
WBC # BLD AUTO: 7.48 K/UL (ref 4.5–11)

## 2024-06-24 PROCEDURE — 1159F MED LIST DOCD IN RCRD: CPT | Mod: CPTII,,, | Performed by: NURSE PRACTITIONER

## 2024-06-24 PROCEDURE — 3080F DIAST BP >= 90 MM HG: CPT | Mod: CPTII,,, | Performed by: NURSE PRACTITIONER

## 2024-06-24 PROCEDURE — 1160F RVW MEDS BY RX/DR IN RCRD: CPT | Mod: CPTII,,, | Performed by: NURSE PRACTITIONER

## 2024-06-24 PROCEDURE — 80061 LIPID PANEL: CPT | Mod: ,,, | Performed by: CLINICAL MEDICAL LABORATORY

## 2024-06-24 PROCEDURE — 3008F BODY MASS INDEX DOCD: CPT | Mod: CPTII,,, | Performed by: NURSE PRACTITIONER

## 2024-06-24 PROCEDURE — 85025 COMPLETE CBC W/AUTO DIFF WBC: CPT | Mod: ,,, | Performed by: CLINICAL MEDICAL LABORATORY

## 2024-06-24 PROCEDURE — 99214 OFFICE O/P EST MOD 30 MIN: CPT | Mod: ,,, | Performed by: NURSE PRACTITIONER

## 2024-06-24 PROCEDURE — 3077F SYST BP >= 140 MM HG: CPT | Mod: CPTII,,, | Performed by: NURSE PRACTITIONER

## 2024-06-24 PROCEDURE — 80053 COMPREHEN METABOLIC PANEL: CPT | Mod: ,,, | Performed by: CLINICAL MEDICAL LABORATORY

## 2024-06-24 RX ORDER — AMLODIPINE BESYLATE 10 MG/1
10 TABLET ORAL DAILY
Qty: 90 TABLET | Refills: 1 | Status: SHIPPED | OUTPATIENT
Start: 2024-06-24

## 2024-06-24 RX ORDER — LOSARTAN POTASSIUM AND HYDROCHLOROTHIAZIDE 25; 100 MG/1; MG/1
1 TABLET ORAL DAILY
Qty: 90 TABLET | Refills: 1 | Status: SHIPPED | OUTPATIENT
Start: 2024-06-24

## 2024-06-24 NOTE — LETTER
June 24, 2024      [facility]  [address] [address]  Phone: [phone]  Fax: [phone]       Patient: Ti Hobbs   YOB: 1985  Date of Visit: 06/24/2024    To Whom It May Concern:    BETTIE Hobbs  was at [facility] on 06/24/2024. The patient may return to work/school on 06/24/2024. If you have any questions or concerns, or if I can be of further assistance, please do not hesitate to contact me.    Sincerely,      QUAN RobertP

## 2024-06-25 ENCOUNTER — TELEPHONE (OUTPATIENT)
Dept: FAMILY MEDICINE | Facility: CLINIC | Age: 39
End: 2024-06-25
Payer: COMMERCIAL

## 2024-06-25 PROBLEM — E78.2 MIXED HYPERLIPIDEMIA: Status: ACTIVE | Noted: 2024-06-25

## 2024-06-25 RX ORDER — ROSUVASTATIN CALCIUM 5 MG/1
5 TABLET, COATED ORAL DAILY
Qty: 90 TABLET | Refills: 1 | Status: SHIPPED | OUTPATIENT
Start: 2024-06-25 | End: 2025-06-25

## 2024-06-25 NOTE — TELEPHONE ENCOUNTER
Spoke with pt re: labs. Instructed pt to  presc for Crestor 5 mg at pharmacy. Discussed cholesterol goals/diet

## 2024-06-25 NOTE — TELEPHONE ENCOUNTER
----- Message from Margaret Nettles sent at 6/25/2024 11:08 AM CDT -----  Who Called: Wadedior Anjel    Patient is returning phone call      Preferred Method of Contact: Phone Call  Patient's Preferred Phone Number on File: [phone]   Best Call Back Number, if different:  Additional Information: no phone encounter - not sure who call came from

## 2024-06-25 NOTE — ASSESSMENT & PLAN NOTE
BP Readings from Last 3 Encounters:   06/24/24 (!) 160/97   03/28/24 (!) 148/91   12/18/23 121/81    Goal less 130/80  Take meds 30min prior to next appt

## 2024-06-25 NOTE — PROGRESS NOTES
JAY Robert   [facility] [address] [place] [address]  [phone]      PATIENT NAME: Ti Hobbs  : 1985  DATE: 24  MRN: [id number]      Billing Provider: JAY Robert  Level of Service:   Patient PCP Information       Provider PCP Type    JAY Robert General            Reason for Visit / Chief Complaint: Follow-up and Hypertension       Update PCP  Update Chief Complaint         History of Present Illness / Problem Focused Workflow     Ti Hobbs presents to the clinic with Follow-up and Hypertension     Pt presents for routine follow up with lab and med refills. Overall doing well.      BP appears poorly controlled today. Home meds reviewed  Advised to monitor BP at home. Advised on optimal BP readings - SBP < 130 & DBP < 80. Advised to call office for any persistent BP elevation and may have to prescribe or adjust BP med(s).  Recommended DASH diet, stay well hydrated with water daily, eliminate or decrease caffeinated and high calorie drinks, increase physical activity, and lose weight if BMI > 25.0. Written patient education information provided to patient with goals and recommendations to assist with BP management.     Discussed need for low fat/low cholesterol diet, regular exercise, and weight control.   Cardiovascular risk and specific lipid/LDL goals reviewed.        Review of Systems     Review of Systems   Constitutional:  Negative for fatigue and fever.   HENT:  Negative for nasal congestion and sore throat.    Eyes:  Negative for visual disturbance.   Respiratory:  Negative for chest tightness and shortness of breath.    Cardiovascular:  Negative for chest pain and leg swelling.   Gastrointestinal:  Negative for abdominal pain and change in bowel habit.   Endocrine: Negative for polydipsia, polyphagia and polyuria.   Genitourinary:  Negative for  dysuria and hematuria.   Musculoskeletal:  Negative for back pain and leg pain.   Integumentary:  Negative for rash.   Neurological:  Negative for dizziness, syncope, weakness and light-headedness.        Medical / Social / Family History     Past Medical History:   Diagnosis Date    Hypertension        No past surgical history on file.    Social History  Mr. Hobbs  reports that he has been smoking cigarettes. He has a 9 pack-year smoking history. He has never used smokeless tobacco. He reports current alcohol use. He reports current drug use. Drug: Marijuana.    Family History  Mr. Hobbs's family history includes Cancer in his maternal grandmother; Hypertension in his father and mother.    Medications and Allergies     Medications  No outpatient medications have been marked as taking for the 6/24/24 encounter (Office Visit) with Marah Huang ACNP.       Allergies  Review of patient's allergies indicates:   Allergen Reactions    Penicillins        Physical Examination     Vitals:    06/24/24 0953   BP: (!) 160/97   Pulse:    Resp:    Temp:      Physical Exam  Eyes:      Pupils: Pupils are equal, round, and reactive to light.   Cardiovascular:      Rate and Rhythm: Normal rate and regular rhythm.      Heart sounds: Normal heart sounds. No murmur heard.  Pulmonary:      Breath sounds: Normal breath sounds. No wheezing, rhonchi or rales.   Abdominal:      General: Bowel sounds are normal.   Musculoskeletal:         General: No swelling.      Cervical back: Normal range of motion and neck supple.   Skin:     General: Skin is warm and dry.   Neurological:      Mental Status: He is alert and oriented to person, place, and time.          Lab Results   Component Value Date    WBC 7.48 06/24/2024    HGB 15.8 06/24/2024    HCT 47.1 06/24/2024    MCV 93.3 06/24/2024     (H) 06/24/2024        Sodium   Date Value Ref Range Status   06/24/2024 138 136 - 145 mmol/L Final     Potassium   Date Value Ref Range  "Status   06/24/2024 3.9 3.5 - 5.1 mmol/L Final     Chloride   Date Value Ref Range Status   06/24/2024 106 98 - 107 mmol/L Final     CO2   Date Value Ref Range Status   06/24/2024 27 21 - 32 mmol/L Final     Glucose   Date Value Ref Range Status   06/24/2024 84 74 - 106 mg/dL Final     BUN   Date Value Ref Range Status   06/24/2024 14 7 - 18 mg/dL Final     Creatinine   Date Value Ref Range Status   06/24/2024 1.00 0.70 - 1.30 mg/dL Final     Calcium   Date Value Ref Range Status   06/24/2024 9.2 8.5 - 10.1 mg/dL Final     Total Protein   Date Value Ref Range Status   06/24/2024 6.3 (L) 6.4 - 8.2 g/dL Final     Albumin   Date Value Ref Range Status   06/24/2024 2.6 (L) 3.5 - 5.0 g/dL Final     Bilirubin, Total   Date Value Ref Range Status   06/24/2024 0.7 >0.0 - 1.2 mg/dL Final     Alk Phos   Date Value Ref Range Status   06/24/2024 65 45 - 115 U/L Final     AST   Date Value Ref Range Status   06/24/2024 23 15 - 37 U/L Final     ALT   Date Value Ref Range Status   06/24/2024 20 16 - 61 U/L Final     Anion Gap   Date Value Ref Range Status   06/24/2024 9 7 - 16 mmol/L Final     eGFR   Date Value Ref Range Status   06/24/2024 98 >=60 mL/min/1.73m2 Final      Lab Results   Component Value Date    HGBA1C 5.2 12/18/2023      Lab Results   Component Value Date    CHOL 258 (H) 06/24/2024    CHOL 256 (H) 12/18/2023    CHOL 281 (H) 06/19/2023     Lab Results   Component Value Date    HDL 96 (H) 06/24/2024    HDL 88 (H) 12/18/2023    HDL 92 (H) 06/19/2023     Lab Results   Component Value Date    LDLCALC 151 06/24/2024    LDLCALC 157 12/18/2023    LDLCALC 172 06/19/2023     No results found for: "DLDL"  Lab Results   Component Value Date    TRIG 54 06/24/2024    TRIG 56 12/18/2023    TRIG 87 06/19/2023     Lab Results   Component Value Date    CHOLHDL 2.7 06/24/2024    CHOLHDL 2.9 12/18/2023    CHOLHDL 3.1 06/19/2023      No results found for: "TSH", "Q3TMHOO", "K3QFVMG", "THYROIDAB", "FREET4"     Assessment and Plan " (including Health Maintenance)      Problem List  Smart Sets  Document Outside HM   :    Plan:     1. Essential hypertension, malignant  Assessment & Plan:  BP Readings from Last 3 Encounters:   06/24/24 (!) 160/97   03/28/24 (!) 148/91   12/18/23 121/81    Goal less 130/80  Take meds 30min prior to next appt      Orders:  -     CBC Auto Differential; Future; Expected date: 06/24/2024  -     Comprehensive Metabolic Panel; Future; Expected date: 06/24/2024  -     Lipid Panel; Future; Expected date: 06/24/2024  -     amLODIPine (NORVASC) 10 MG tablet; Take 1 tablet (10 mg total) by mouth once daily.  Dispense: 90 tablet; Refill: 1  -     losartan-hydrochlorothiazide 100-25 mg (HYZAAR) 100-25 mg per tablet; Take 1 tablet by mouth once daily.  Dispense: 90 tablet; Refill: 1    2. Mixed hyperlipidemia  Assessment & Plan:  Lab Results   Component Value Date    CHOL 258 (H) 06/24/2024    CHOL 256 (H) 12/18/2023    CHOL 281 (H) 06/19/2023     Lab Results   Component Value Date    HDL 96 (H) 06/24/2024    HDL 88 (H) 12/18/2023    HDL 92 (H) 06/19/2023     Lab Results   Component Value Date    LDLCALC 151 06/24/2024    LDLCALC 157 12/18/2023    LDLCALC 172 06/19/2023     Lab Results   Component Value Date    TRIG 54 06/24/2024    TRIG 56 12/18/2023    TRIG 87 06/19/2023       Lab Results   Component Value Date    CHOLHDL 2.7 06/24/2024    CHOLHDL 2.9 12/18/2023    CHOLHDL 3.1 06/19/2023    The current medical regimen is effective;  continue present plan and medications.      Orders:  -     rosuvastatin (CRESTOR) 5 MG tablet; Take 1 tablet (5 mg total) by mouth once daily.  Dispense: 90 tablet; Refill: 1         There are no Patient Instructions on file for this visit.     There are no preventive care reminders to display for this patient.      Health Maintenance Topics with due status: Not Due       Topic Last Completion Date    TETANUS VACCINE 06/19/2023    Diabetes Urine Screening 12/18/2023    Hemoglobin A1c 12/18/2023     Lipid Panel 06/24/2024       Future Appointments   Date Time Provider Department Center   8/1/2024  3:20 PM Sofi Burris DO [facility]   12/16/2024  3:40 PM Marah Huang ACNP [facility]            Signature:  [facility] [address] [place] [address]  [phone]    Date of encounter: 6/24/24

## 2024-06-25 NOTE — ASSESSMENT & PLAN NOTE
Lab Results   Component Value Date    CHOL 258 (H) 06/24/2024    CHOL 256 (H) 12/18/2023    CHOL 281 (H) 06/19/2023     Lab Results   Component Value Date    HDL 96 (H) 06/24/2024    HDL 88 (H) 12/18/2023    HDL 92 (H) 06/19/2023     Lab Results   Component Value Date    LDLCALC 151 06/24/2024    LDLCALC 157 12/18/2023    LDLCALC 172 06/19/2023     Lab Results   Component Value Date    TRIG 54 06/24/2024    TRIG 56 12/18/2023    TRIG 87 06/19/2023       Lab Results   Component Value Date    CHOLHDL 2.7 06/24/2024    CHOLHDL 2.9 12/18/2023    CHOLHDL 3.1 06/19/2023    The current medical regimen is effective;  continue present plan and medications.

## 2024-08-01 ENCOUNTER — OFFICE VISIT (OUTPATIENT)
Dept: NEPHROLOGY | Facility: CLINIC | Age: 39
End: 2024-08-01
Payer: COMMERCIAL

## 2024-08-01 VITALS
BODY MASS INDEX: 24.34 KG/M2 | OXYGEN SATURATION: 97 % | DIASTOLIC BLOOD PRESSURE: 100 MMHG | HEIGHT: 70 IN | HEART RATE: 81 BPM | RESPIRATION RATE: 16 BRPM | SYSTOLIC BLOOD PRESSURE: 162 MMHG | WEIGHT: 170 LBS

## 2024-08-01 DIAGNOSIS — N18.9 CHRONIC KIDNEY DISEASE, UNSPECIFIED CKD STAGE: ICD-10-CM

## 2024-08-01 DIAGNOSIS — R80.9 PROTEINURIA, UNSPECIFIED TYPE: ICD-10-CM

## 2024-08-01 DIAGNOSIS — I10 ESSENTIAL HYPERTENSION, MALIGNANT: Primary | ICD-10-CM

## 2024-08-01 PROCEDURE — 3080F DIAST BP >= 90 MM HG: CPT | Mod: CPTII,,, | Performed by: INTERNAL MEDICINE

## 2024-08-01 PROCEDURE — 99999 PR PBB SHADOW E&M-EST. PATIENT-LVL IV: CPT | Mod: PBBFAC,,, | Performed by: INTERNAL MEDICINE

## 2024-08-01 PROCEDURE — 3008F BODY MASS INDEX DOCD: CPT | Mod: CPTII,,, | Performed by: INTERNAL MEDICINE

## 2024-08-01 PROCEDURE — 3077F SYST BP >= 140 MM HG: CPT | Mod: CPTII,,, | Performed by: INTERNAL MEDICINE

## 2024-08-01 PROCEDURE — 3066F NEPHROPATHY DOC TX: CPT | Mod: CPTII,,, | Performed by: INTERNAL MEDICINE

## 2024-08-01 PROCEDURE — 1159F MED LIST DOCD IN RCRD: CPT | Mod: CPTII,,, | Performed by: INTERNAL MEDICINE

## 2024-08-01 PROCEDURE — 99204 OFFICE O/P NEW MOD 45 MIN: CPT | Mod: S$PBB,,, | Performed by: INTERNAL MEDICINE

## 2024-08-01 PROCEDURE — 99214 OFFICE O/P EST MOD 30 MIN: CPT | Mod: PBBFAC | Performed by: INTERNAL MEDICINE

## 2024-08-01 RX ORDER — NIFEDIPINE 90 MG/1
90 TABLET, EXTENDED RELEASE ORAL DAILY
Qty: 90 TABLET | Refills: 3 | Status: SHIPPED | OUTPATIENT
Start: 2024-08-01 | End: 2025-08-01

## 2024-08-01 NOTE — PROGRESS NOTES
[facility] History and Physical  Patient Name: Ti Hobbs  MRN: [id number]  Age: 39 y.o.  : 1985    Date: 2024 2:49 PM    Chief Complaint: Establish Care    HPI :   Mr Hobbs  presents to Nephrology clinic to establish care. He is followed by JAY Payton as her primary care provider.     HTN: diagnosed in . Current regimen includes amlodipine 10 mg daily, losartan-hydrochlorothiazide 100-25 mg daily      Nephrology history: No FH of kidney disease, no nephrolithiasis, or recurrent UTIs. No OTC medications including NSAIDs.     Patient denies any CP, SOB, peripheral edema, dysuria, hematuria, changes in urinary habits, or increased frequency of urination.     Past Medical History:  has a past medical history of Hypertension.     Past Surgical History:   has no past surgical history on file.     Family History:  family history includes Cancer in his maternal grandmother; Hypertension in his father and mother. No family history of kidney disease.     Social History:   reports that he has been smoking cigarettes. He has a 9 pack-year smoking history. He has never used smokeless tobacco. He reports current alcohol use. He reports current drug use. Drug: Marijuana. Works at [employer].     Allergies: is allergic to penicillins.     Medications: Reviewed including OTC medications, herbal supplements, and NSAIDS.     Old records have been reviewed.      Review of Systems:  ROS: A 10 point ROS was completed and found to be negative except for that mentioned above.          Physical Exam:  Vitals:    24 1443   BP: (!) 162/100   Pulse: 81   Resp: 16       Constitutional: sitting in chair, in NAD  Eyes: EOMI, white sclera  ENMT: moist mucus membranes, nares patent  Cardiovascular: normal rate, S1/S2 noted, no edema  Respiratory: symmetrical chest expansion, CTA-B  Gastrointestinal: +BS, soft, NT/ND  Musculoskeletal: normal, no joint  erythema/effusions  Skin: no rash, no purpura, warm extremities  Neurological: Alert and Oriented x 4, afocal    Labs:   Lab Results   Component Value Date     06/24/2024    K 3.9 06/24/2024    CREATININE 1.00 06/24/2024    ALT 20 06/24/2024    AST 23 06/24/2024    HGBA1C 5.2 12/18/2023    LDLCALC 151 06/24/2024    CHOL 258 (H) 06/24/2024    HDL 96 (H) 06/24/2024    TRIG 54 06/24/2024    WBC 7.48 06/24/2024    HGB 15.8 06/24/2024    HCT 47.1 06/24/2024     (H) 06/24/2024        Otherwise Reviewed    Assessment/Plan:       Proteinuria: urine Prot:Creat ratio is pending. Patient is on RAAS blockade with ARB. Baseline sCr 1, today sCr pending. Counseled to avoid nephrotoxic agents such as NSAIDs. Will obtain baseline renal ultrasound. Will work up proteinuria with serologies including BROOKE, C3, C4, SPEP, UPEP, IF, FLC, HIV, Hepatitis Panel, HbA1C.     Anemia: CBC pending    BMD: Calcium and Phosphorus PTH, Vit D pending    HTN: uncontrolled with current meds. Will substitute amlodipine for nifedipine today. Will add one agent at a time. Will check renin/armani. Will check renal duplex ultrasound.     RTC 4-6 weeks with CBC, RFP, UA, urine for Prot:creat ratio, PTH, Vit D      Alisha S. Parker, DO [facility]   08/01/2024

## 2024-08-02 DIAGNOSIS — E78.2 MIXED HYPERLIPIDEMIA: ICD-10-CM

## 2024-08-05 RX ORDER — ROSUVASTATIN CALCIUM 5 MG/1
TABLET, COATED ORAL
Qty: 90 TABLET | Refills: 1 | Status: SHIPPED | OUTPATIENT
Start: 2024-08-05

## 2024-08-14 ENCOUNTER — HOSPITAL ENCOUNTER (OUTPATIENT)
Dept: RADIOLOGY | Facility: HOSPITAL | Age: 39
Discharge: HOME | End: 2024-08-14
Attending: INTERNAL MEDICINE
Payer: COMMERCIAL

## 2024-08-14 DIAGNOSIS — N18.9 CHRONIC KIDNEY DISEASE, UNSPECIFIED CKD STAGE: ICD-10-CM

## 2024-08-14 PROCEDURE — 76770 US EXAM ABDO BACK WALL COMP: CPT | Mod: TC

## 2024-08-14 PROCEDURE — 76775 US EXAM ABDO BACK WALL LIM: CPT | Mod: 26,59,, | Performed by: RADIOLOGY

## 2024-08-14 PROCEDURE — 93975 VASCULAR STUDY: CPT | Mod: 26,,, | Performed by: RADIOLOGY

## 2024-09-04 ENCOUNTER — OFFICE VISIT (OUTPATIENT)
Dept: NEPHROLOGY | Facility: CLINIC | Age: 39
End: 2024-09-04
Payer: COMMERCIAL

## 2024-09-04 VITALS
OXYGEN SATURATION: 99 % | SYSTOLIC BLOOD PRESSURE: 148 MMHG | DIASTOLIC BLOOD PRESSURE: 90 MMHG | WEIGHT: 172.63 LBS | BODY MASS INDEX: 24.71 KG/M2 | HEART RATE: 80 BPM | HEIGHT: 70 IN | RESPIRATION RATE: 18 BRPM

## 2024-09-04 DIAGNOSIS — I10 ESSENTIAL HYPERTENSION, MALIGNANT: ICD-10-CM

## 2024-09-04 DIAGNOSIS — R80.9 PROTEINURIA, UNSPECIFIED TYPE: Primary | ICD-10-CM

## 2024-09-04 PROCEDURE — 3066F NEPHROPATHY DOC TX: CPT | Mod: CPTII,,, | Performed by: INTERNAL MEDICINE

## 2024-09-04 PROCEDURE — 99999 PR PBB SHADOW E&M-EST. PATIENT-LVL IV: CPT | Mod: PBBFAC,,, | Performed by: INTERNAL MEDICINE

## 2024-09-04 PROCEDURE — 3062F POS MACROALBUMINURIA REV: CPT | Mod: CPTII,,, | Performed by: INTERNAL MEDICINE

## 2024-09-04 PROCEDURE — 1159F MED LIST DOCD IN RCRD: CPT | Mod: CPTII,,, | Performed by: INTERNAL MEDICINE

## 2024-09-04 PROCEDURE — 3077F SYST BP >= 140 MM HG: CPT | Mod: CPTII,,, | Performed by: INTERNAL MEDICINE

## 2024-09-04 PROCEDURE — 99214 OFFICE O/P EST MOD 30 MIN: CPT | Mod: PBBFAC | Performed by: INTERNAL MEDICINE

## 2024-09-04 PROCEDURE — 3044F HG A1C LEVEL LT 7.0%: CPT | Mod: CPTII,,, | Performed by: INTERNAL MEDICINE

## 2024-09-04 PROCEDURE — 3008F BODY MASS INDEX DOCD: CPT | Mod: CPTII,,, | Performed by: INTERNAL MEDICINE

## 2024-09-04 PROCEDURE — 99214 OFFICE O/P EST MOD 30 MIN: CPT | Mod: S$PBB,,, | Performed by: INTERNAL MEDICINE

## 2024-09-04 PROCEDURE — 3080F DIAST BP >= 90 MM HG: CPT | Mod: CPTII,,, | Performed by: INTERNAL MEDICINE

## 2024-09-04 RX ORDER — CARVEDILOL 12.5 MG/1
12.5 TABLET ORAL 2 TIMES DAILY WITH MEALS
Qty: 180 TABLET | Refills: 3 | Status: SHIPPED | OUTPATIENT
Start: 2024-09-04 | End: 2025-09-04

## 2024-09-04 RX ORDER — DAPAGLIFLOZIN 10 MG/1
10 TABLET, FILM COATED ORAL DAILY
Qty: 30 TABLET | Refills: 11 | Status: SHIPPED | OUTPATIENT
Start: 2024-09-04 | End: 2025-09-04

## 2024-09-04 NOTE — PROGRESS NOTES
[facility] History and Physical  Patient Name: Ti Hobbs  MRN: [id number]  Age: 39 y.o.  : 1985    Date: 2024 2:49 PM    Chief Complaint: Follow Up    HPI :   Mr Hobbs  presents to Nephrology clinic for lfollow up.  He is followed by JAY Payton as her primary care provider.     HTN: diagnosed in . Current regimen includes nifedipine 90 mg daily, losartan-hydrochlorothiazide 100-25 mg daily. BP better controlled. Remains elevated.       Nephrology history: No FH of kidney disease, no nephrolithiasis, or recurrent UTIs. No OTC medications including NSAIDs.     Patient denies any CP, SOB, peripheral edema, dysuria, hematuria, changes in urinary habits, or increased frequency of urination.      Past Medical History:  has a past medical history of Hypertension.     Past Surgical History:   has no past surgical history on file.     Family History:  family history includes Cancer in his maternal grandmother; Hypertension in his father and mother. No family history of kidney disease.     Social History:   reports that he has been smoking cigarettes. He has a 9 pack-year smoking history. He has never used smokeless tobacco. He reports current alcohol use. He reports current drug use. Drug: Marijuana. Works at [employer].     Allergies: is allergic to penicillins.     Medications: Reviewed including OTC medications, herbal supplements, and NSAIDS.     Old records have been reviewed.      Review of Systems:  ROS: A 10 point ROS was completed and found to be negative except for that mentioned above.          Physical Exam:  Vitals:    24 1336   BP: (!) 148/90   Pulse: 80   Resp: 18       Constitutional: sitting in chair, in NAD  Eyes: EOMI, white sclera  ENMT: moist mucus membranes, nares patent  Cardiovascular: normal rate, S1/S2 noted, no edema  Respiratory: symmetrical chest expansion, CTA-B  Gastrointestinal: +BS, soft,  NT/ND  Musculoskeletal: normal, no joint erythema/effusions  Skin: no rash, no purpura, warm extremities  Neurological: Alert and Oriented x 4, afocal    Labs:   Lab Results   Component Value Date     08/01/2024    K 3.5 08/01/2024    CREATININE 1.05 08/01/2024    ALT 20 06/24/2024    AST 23 06/24/2024    HGBA1C 5.1 08/01/2024    LDLCALC 151 06/24/2024    CHOL 258 (H) 06/24/2024    HDL 96 (H) 06/24/2024    TRIG 54 06/24/2024    WBC 7.71 08/01/2024    HGB 15.7 08/01/2024    HCT 46.5 08/01/2024     (H) 08/01/2024        Otherwise Reviewed    Assessment/Plan:       Proteinuria: urine Prot:Creat ratio is extremely elevated. Patient is on RAAS blockade with ARB. Baseline sCr 1, today sCr pending. Counseled to avoid nephrotoxic agents such as NSAIDs. All proteinuria serologies including BROOKE, C3, C4, SPEP, UPEP, IF, FLC, HIV, Hepatitis Panel, HbA1C have resulted as normal. I suspect this high level of proteinuria is due to his HTN. He wishes to hold off on renal biopsy at this time and try medications to help treat the proteinuria. He is on max dose of ARB. I am adding SLGT2i today. If he continues to have proteinuria after being on ARB, SGLT2i, I would add kerendia as well.     Anemia: CBC pending    BMD: Calcium and Phosphorus PTH, Vit D pending    HTN: uncontrolled with current meds. Improved but not at goal. Add coreg 12.5 mg BID. This can be titrated up as needed.     RTC 3 months with CBC, RFP, UA, urine for Prot:creat ratio, PTH, Vit D to monitor BP, proteinuria.       Alisha S. Parker, DO [facility]   09/04/2024

## 2025-03-06 ENCOUNTER — OFFICE VISIT (OUTPATIENT)
Dept: FAMILY MEDICINE | Facility: CLINIC | Age: 40
End: 2025-03-06
Payer: COMMERCIAL

## 2025-03-06 VITALS
DIASTOLIC BLOOD PRESSURE: 87 MMHG | WEIGHT: 159 LBS | HEIGHT: 68 IN | SYSTOLIC BLOOD PRESSURE: 120 MMHG | OXYGEN SATURATION: 98 % | TEMPERATURE: 98 F | BODY MASS INDEX: 24.1 KG/M2 | HEART RATE: 103 BPM

## 2025-03-06 DIAGNOSIS — I10 ESSENTIAL HYPERTENSION, MALIGNANT: Primary | ICD-10-CM

## 2025-03-06 DIAGNOSIS — E78.2 MIXED HYPERLIPIDEMIA: ICD-10-CM

## 2025-03-06 DIAGNOSIS — R73.01 IFG (IMPAIRED FASTING GLUCOSE): ICD-10-CM

## 2025-03-06 PROCEDURE — 99214 OFFICE O/P EST MOD 30 MIN: CPT | Mod: ,,, | Performed by: NURSE PRACTITIONER

## 2025-03-06 PROCEDURE — 1160F RVW MEDS BY RX/DR IN RCRD: CPT | Mod: CPTII,,, | Performed by: NURSE PRACTITIONER

## 2025-03-06 PROCEDURE — 3074F SYST BP LT 130 MM HG: CPT | Mod: CPTII,,, | Performed by: NURSE PRACTITIONER

## 2025-03-06 PROCEDURE — 3079F DIAST BP 80-89 MM HG: CPT | Mod: CPTII,,, | Performed by: NURSE PRACTITIONER

## 2025-03-06 PROCEDURE — 3008F BODY MASS INDEX DOCD: CPT | Mod: CPTII,,, | Performed by: NURSE PRACTITIONER

## 2025-03-06 PROCEDURE — 1159F MED LIST DOCD IN RCRD: CPT | Mod: CPTII,,, | Performed by: NURSE PRACTITIONER

## 2025-03-06 RX ORDER — CARVEDILOL 12.5 MG/1
12.5 TABLET ORAL 2 TIMES DAILY WITH MEALS
Qty: 180 TABLET | Refills: 1 | Status: SHIPPED | OUTPATIENT
Start: 2025-03-06 | End: 2026-03-06

## 2025-03-06 RX ORDER — ROSUVASTATIN CALCIUM 5 MG/1
5 TABLET, COATED ORAL NIGHTLY
Qty: 90 TABLET | Refills: 1 | Status: SHIPPED | OUTPATIENT
Start: 2025-03-06

## 2025-03-06 RX ORDER — NIFEDIPINE 90 MG/1
90 TABLET, EXTENDED RELEASE ORAL DAILY
Qty: 90 TABLET | Refills: 1 | Status: SHIPPED | OUTPATIENT
Start: 2025-03-06 | End: 2026-03-06

## 2025-03-06 RX ORDER — LOSARTAN POTASSIUM AND HYDROCHLOROTHIAZIDE 25; 100 MG/1; MG/1
1 TABLET ORAL DAILY
Qty: 90 TABLET | Refills: 1 | Status: SHIPPED | OUTPATIENT
Start: 2025-03-06

## 2025-03-06 NOTE — ASSESSMENT & PLAN NOTE
BP Readings from Last 3 Encounters:   03/06/25 120/87   09/04/24 (!) 148/90   08/01/24 (!) 162/100    The current medical regimen is effective;  continue present plan and medications.

## 2025-03-07 LAB
ALBUMIN SERPL BCP-MCNC: 2.4 G/DL (ref 3.5–5)
ALBUMIN/GLOB SERPL: 0.8 {RATIO}
ALP SERPL-CCNC: 64 U/L (ref 40–150)
ALT SERPL W P-5'-P-CCNC: 20 U/L
ANION GAP SERPL CALCULATED.3IONS-SCNC: 9 MMOL/L (ref 7–16)
AST SERPL W P-5'-P-CCNC: 30 U/L (ref 5–34)
BILIRUB SERPL-MCNC: 0.6 MG/DL
BUN SERPL-MCNC: 16 MG/DL (ref 9–21)
BUN/CREAT SERPL: 16 (ref 6–20)
CALCIUM SERPL-MCNC: 8.4 MG/DL (ref 8.4–10.2)
CHLORIDE SERPL-SCNC: 105 MMOL/L (ref 98–107)
CHOLEST SERPL-MCNC: 218 MG/DL
CHOLEST/HDLC SERPL: 2.8 {RATIO}
CO2 SERPL-SCNC: 29 MMOL/L (ref 22–29)
CREAT SERPL-MCNC: 0.97 MG/DL (ref 0.72–1.25)
EGFR (NO RACE VARIABLE) (RUSH/TITUS): 102 ML/MIN/1.73M2
EST. AVERAGE GLUCOSE BLD GHB EST-MCNC: 97 MG/DL
GLOBULIN SER-MCNC: 3 G/DL (ref 2–4)
GLUCOSE SERPL-MCNC: 107 MG/DL (ref 74–100)
HBA1C MFR BLD HPLC: 5 %
HDLC SERPL-MCNC: 79 MG/DL (ref 35–60)
LDLC SERPL CALC-MCNC: 122 MG/DL
LDLC/HDLC SERPL: 1.5 {RATIO}
NONHDLC SERPL-MCNC: 139 MG/DL
POTASSIUM SERPL-SCNC: 4.1 MMOL/L (ref 3.5–5.1)
PROT SERPL-MCNC: 5.4 G/DL (ref 6.4–8.3)
SODIUM SERPL-SCNC: 139 MMOL/L (ref 136–145)
TRIGL SERPL-MCNC: 87 MG/DL (ref 34–140)
VLDLC SERPL-MCNC: 17 MG/DL

## 2025-03-10 ENCOUNTER — RESULTS FOLLOW-UP (OUTPATIENT)
Dept: FAMILY MEDICINE | Facility: CLINIC | Age: 40
End: 2025-03-10

## 2025-03-10 RX ORDER — ROSUVASTATIN CALCIUM 10 MG/1
10 TABLET, COATED ORAL DAILY
Qty: 90 TABLET | Refills: 1 | Status: SHIPPED | OUTPATIENT
Start: 2025-03-10 | End: 2026-03-10

## 2025-05-05 NOTE — PROGRESS NOTES
JAY Robert   [facility] [id number] [place] [address]  [phone]      PATIENT NAME: Ti Hobbs  : 1985  DATE: 3/6/25  MRN: [id number]      Billing Provider: JAY Robert  Level of Service:   Patient PCP Information       Provider PCP Type    JAY Robert General            Reason for Visit / Chief Complaint: Follow-up, Hypertension, Hyperlipidemia, and Health Maintenance (..Pneumococcal Vaccines (Age 0-49)(1 of 2 - PCV) Never done/Influenza Vaccine-declined 3/6/2025/)       Update PCP  Update Chief Complaint         History of Present Illness / Problem Focused Workflow     Ti Hobbs presents to the clinic with Follow-up, Hypertension, Hyperlipidemia, and Health Maintenance (..Pneumococcal Vaccines (Age 0-49)(1 of 2 - PCV) Never done/Influenza Vaccine-declined 3/6/2025/)     Pt presents for routine follow up with lab and med refills. Overall doing well.      BP appears  controlled today. Home meds reviewed    The current medical regimen is effective;  continue present plan and medications. Recommended patient to check home readings to monitor and see me for followup as scheduled or sooner as needed.   Discussed sodium restriction, maintaining ideal body weight and regular exercise program as physiologic means to continue to achieve blood pressure control in addition to medication compliance.  Patient was educated that both decongestant and anti-inflammatory medication may raise blood pressure.    Advised to monitor BP at home. Advised on optimal BP readings - SBP < 130 & DBP < 80. Advised to call office for any persistent BP elevation and may have to prescribe or adjust BP med(s).  Recommended DASH diet, stay well hydrated with water daily, eliminate or decrease caffeinated and high calorie drinks, increase physical activity, and lose weight if BMI > 25.0. Written  I attempted to reach the patient and left her a voicemail detailing that I discussed her breast imaging findings with my collaborating surgeon, Dr. Marycruz Mackey, who agreed with my proposed plan of care without additional treatment recommendations this time. I informed the patient that I will follow-up the results of her RIGHT diagnostic ultrasound in August 2025 to determine next steps in management. I informed the patient to reach out to our office with any new breast concerns, specifically if she is able to feel an enlarging RIGHT breast mass.    Rafia Sánchez CNP  Advanced Practice Nurse  [facility]  [facility]  [address]  [place] [address]  O: [phone]  F: [phone]    patient education information provided to patient with goals and recommendations to assist with BP management.     Discussed need for low fat/low cholesterol diet, regular exercise, and weight control.   Cardiovascular risk and specific lipid/LDL goals reviewed.    Diabetes treatment goals: (unless otherwise indicated due to risk factor stratification)  To achieve normal or near normal glucose levels.  Fasting glucose:   Before meals: 100-140  2 hours after meal: less 130  Bedtime goal glucose > 100  Avoid concentrated sweets/sugars, limit carbohydrates and cholesterol  Previous lab reviewed, goal A1c less 7          Review of Systems     Review of Systems   Constitutional:  Negative for fatigue and fever.   HENT:  Negative for nasal congestion and sore throat.    Eyes:  Negative for visual disturbance.   Respiratory:  Negative for chest tightness and shortness of breath.    Cardiovascular:  Negative for chest pain and leg swelling.   Gastrointestinal:  Negative for abdominal pain and change in bowel habit.   Endocrine: Negative for polydipsia, polyphagia and polyuria.   Genitourinary:  Negative for dysuria and hematuria.   Musculoskeletal:  Negative for back pain and leg pain.   Integumentary:  Negative for rash.   Neurological:  Negative for dizziness, syncope, weakness and light-headedness.        Medical / Social / Family History     Past Medical History:   Diagnosis Date    Hyperlipidemia     Hypertension        History reviewed. No pertinent surgical history.    Social History  Mr. Hobbs  reports that he has been smoking cigarettes and cigars. He started smoking about 25 years ago. He has a 12.6 pack-year smoking history. He has been exposed to tobacco smoke. He has never used smokeless tobacco. He reports that he does not currently use alcohol after a past usage of about 2.0 standard drinks of alcohol per week. He reports current drug use. Drug: Marijuana.    Family History  Mr. Hobbs's family  history includes Alcohol abuse in his father; Cancer in his maternal grandmother; Hypertension in his father and mother.    Medications and Allergies     Medications  Outpatient Medications Marked as Taking for the 3/6/25 encounter (Office Visit) with Marah Huang ACNP   Medication Sig Dispense Refill    [DISCONTINUED] carvediloL (COREG) 12.5 MG tablet Take 1 tablet (12.5 mg total) by mouth 2 (two) times daily with meals. 180 tablet 3    [DISCONTINUED] losartan-hydrochlorothiazide 100-25 mg (HYZAAR) 100-25 mg per tablet Take 1 tablet by mouth once daily. 90 tablet 1    [DISCONTINUED] NIFEdipine (PROCARDIA-XL) 90 MG (OSM) 24 hr tablet Take 1 tablet (90 mg total) by mouth once daily. 90 tablet 3    [DISCONTINUED] rosuvastatin (CRESTOR) 5 MG tablet TAKE 1 TABLET (5 MG TOTAL) BY MOUTH ONCE DAILY AT BEDTIME FOR CHOLESTEROL. 90 tablet 1       Allergies  Review of patient's allergies indicates:   Allergen Reactions    Penicillins        Physical Examination     Vitals:    03/06/25 1432   BP: 120/87   Pulse: 103   Temp: 98.3 °F (36.8 °C)     Physical Exam  Eyes:      Pupils: Pupils are equal, round, and reactive to light.   Cardiovascular:      Rate and Rhythm: Normal rate and regular rhythm.      Heart sounds: Normal heart sounds. No murmur heard.  Pulmonary:      Breath sounds: Normal breath sounds. No wheezing, rhonchi or rales.   Abdominal:      General: Bowel sounds are normal.      Palpations: Abdomen is soft.   Musculoskeletal:         General: No swelling.      Cervical back: Normal range of motion and neck supple.   Skin:     General: Skin is warm and dry.   Neurological:      Mental Status: He is alert and oriented to person, place, and time.          Lab Results   Component Value Date    WBC 7.71 08/01/2024    HGB 15.7 08/01/2024    HCT 46.5 08/01/2024    MCV 92.6 08/01/2024     (H) 08/01/2024        Sodium   Date Value Ref Range Status   08/01/2024 141 136 - 145 mmol/L Final     Potassium   Date  "Value Ref Range Status   08/01/2024 3.5 3.5 - 5.1 mmol/L Final     Chloride   Date Value Ref Range Status   08/01/2024 108 (H) 98 - 107 mmol/L Final     CO2   Date Value Ref Range Status   08/01/2024 29 21 - 32 mmol/L Final     Glucose   Date Value Ref Range Status   08/01/2024 96 74 - 106 mg/dL Final     BUN   Date Value Ref Range Status   08/01/2024 14 7 - 18 mg/dL Final     Creatinine   Date Value Ref Range Status   08/01/2024 1.05 0.70 - 1.30 mg/dL Final     Calcium   Date Value Ref Range Status   08/01/2024 9.2 8.5 - 10.1 mg/dL Final     Total Protein   Date Value Ref Range Status   08/01/2024 6.1 (L) 6.4 - 8.2 g/dL Final     Albumin   Date Value Ref Range Status   08/01/2024 2.6 (L) 3.5 - 5.0 g/dL Final     Bilirubin, Total   Date Value Ref Range Status   06/24/2024 0.7 >0.0 - 1.2 mg/dL Final     Alk Phos   Date Value Ref Range Status   06/24/2024 65 45 - 115 U/L Final     AST   Date Value Ref Range Status   06/24/2024 23 15 - 37 U/L Final     ALT   Date Value Ref Range Status   06/24/2024 20 16 - 61 U/L Final     Anion Gap   Date Value Ref Range Status   08/01/2024 8 7 - 16 mmol/L Final     eGFR   Date Value Ref Range Status   08/01/2024 93 >=60 mL/min/1.73m2 Final      Lab Results   Component Value Date    HGBA1C 5.1 08/01/2024      Lab Results   Component Value Date    CHOL 258 (H) 06/24/2024    CHOL 256 (H) 12/18/2023    CHOL 281 (H) 06/19/2023     Lab Results   Component Value Date    HDL 96 (H) 06/24/2024    HDL 88 (H) 12/18/2023    HDL 92 (H) 06/19/2023     Lab Results   Component Value Date    LDLCALC 151 06/24/2024    LDLCALC 157 12/18/2023    LDLCALC 172 06/19/2023     No results found for: "DLDL"  Lab Results   Component Value Date    TRIG 54 06/24/2024    TRIG 56 12/18/2023    TRIG 87 06/19/2023     Lab Results   Component Value Date    CHOLHDL 2.7 06/24/2024    CHOLHDL 2.9 12/18/2023    CHOLHDL 3.1 06/19/2023      No results found for: "TSH", "K6SBILU", "S8OTCOP", "THYROIDAB", "FREET4" "     Assessment and Plan (including Health Maintenance)      Problem List  Smart Sets  Document Outside HM   :    Plan:     1. Essential hypertension, malignant  Assessment & Plan:  BP Readings from Last 3 Encounters:   03/06/25 120/87   09/04/24 (!) 148/90   08/01/24 (!) 162/100    The current medical regimen is effective;  continue present plan and medications.      Orders:  -     Comprehensive Metabolic Panel; Future; Expected date: 03/06/2025  -     carvediloL (COREG) 12.5 MG tablet; Take 1 tablet (12.5 mg total) by mouth 2 (two) times daily with meals.  Dispense: 180 tablet; Refill: 1  -     losartan-hydrochlorothiazide 100-25 mg (HYZAAR) 100-25 mg per tablet; Take 1 tablet by mouth once daily.  Dispense: 90 tablet; Refill: 1  -     NIFEdipine (PROCARDIA-XL) 90 MG (OSM) 24 hr tablet; Take 1 tablet (90 mg total) by mouth once daily.  Dispense: 90 tablet; Refill: 1    2. Mixed hyperlipidemia  Assessment & Plan:  Lab Results   Component Value Date    CHOL 258 (H) 06/24/2024    CHOL 256 (H) 12/18/2023    CHOL 281 (H) 06/19/2023     Lab Results   Component Value Date    HDL 96 (H) 06/24/2024    HDL 88 (H) 12/18/2023    HDL 92 (H) 06/19/2023     Lab Results   Component Value Date    LDLCALC 151 06/24/2024    LDLCALC 157 12/18/2023    LDLCALC 172 06/19/2023     Lab Results   Component Value Date    TRIG 54 06/24/2024    TRIG 56 12/18/2023    TRIG 87 06/19/2023       Lab Results   Component Value Date    CHOLHDL 2.7 06/24/2024    CHOLHDL 2.9 12/18/2023    CHOLHDL 3.1 06/19/2023    The current medical regimen is effective;  continue present plan and medications.      Orders:  -     Lipid Panel; Future; Expected date: 03/06/2025  -     rosuvastatin (CRESTOR) 5 MG tablet; Take 1 tablet (5 mg total) by mouth every evening.  Dispense: 90 tablet; Refill: 1    3. IFG (impaired fasting glucose)  -     Hemoglobin A1C; Future; Expected date: 03/06/2025         There are no Patient Instructions on file for this visit.     [facility]  Maintenance Due   Topic Date Due    Pneumococcal Vaccines (Age 0-49) (1 of 2 - PCV) Never done         Health Maintenance Topics with due status: Not Due       Topic Last Completion Date    TETANUS VACCINE 06/19/2023    Lipid Panel 06/24/2024    Hemoglobin A1c 08/01/2024    Diabetes Urine Screening 09/04/2024    RSV Vaccine (Age 60+ and Pregnant patients) Not Due       Future Appointments   Date Time Provider Department Center   9/8/2025  3:00 PM [facility]            Signature:  [facility] [address] [place] [address]  [phone]    Date of encounter: 3/6/25

## 2025-08-25 ENCOUNTER — PATIENT MESSAGE (OUTPATIENT)
Facility: HOSPITAL | Age: 40
End: 2025-08-25
Payer: COMMERCIAL